# Patient Record
Sex: MALE | Race: BLACK OR AFRICAN AMERICAN | NOT HISPANIC OR LATINO | Employment: FULL TIME | URBAN - METROPOLITAN AREA
[De-identification: names, ages, dates, MRNs, and addresses within clinical notes are randomized per-mention and may not be internally consistent; named-entity substitution may affect disease eponyms.]

---
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
## 2019-02-02 ENCOUNTER — HOSPITAL ENCOUNTER (EMERGENCY)
Facility: HOSPITAL | Age: 19
Discharge: HOME | End: 2019-02-02
Attending: EMERGENCY MEDICINE

## 2019-02-02 VITALS
OXYGEN SATURATION: 98 % | BODY MASS INDEX: 17.61 KG/M2 | WEIGHT: 130 LBS | HEART RATE: 63 BPM | RESPIRATION RATE: 18 BRPM | DIASTOLIC BLOOD PRESSURE: 58 MMHG | HEIGHT: 72 IN | SYSTOLIC BLOOD PRESSURE: 104 MMHG | TEMPERATURE: 99 F

## 2019-02-02 DIAGNOSIS — T78.40XA ALLERGIC REACTION, INITIAL ENCOUNTER: Primary | ICD-10-CM

## 2019-02-02 PROCEDURE — 99283 EMERGENCY DEPT VISIT LOW MDM: CPT

## 2019-02-02 RX ORDER — PREDNISONE 50 MG/1
50 TABLET ORAL DAILY
Qty: 3 TABLET | Refills: 0 | Status: SHIPPED | OUTPATIENT
Start: 2019-02-02 | End: 2019-02-05

## 2019-02-03 NOTE — ED PROVIDER NOTES
"Encounter Date: 2019    SCRIBE #1 NOTE: I, Liam Blanco, am scribing for, and in the presence of,  Shiva Dominique MD. I have scribed the following portions of the note - Other sections scribed: HPI, ROS, PE.       History     Chief Complaint   Patient presents with    Allergic Reaction     pt has unknown allergen, has epi pen but was not with him, reports hives and feeling like his throat was closing, given 50mg Benadryl, 125mg Solumedrol, and Epi.      CC: Allergic Reaction    HPI: This 18 y.o male with medical hx of Hives, Laryngeal edema, Tympanoplasty presents to the ED via EMS accompanied by his parents for an evaluation of allergic reaction. Pt reports he began experiencing diffused rashes with pruritis and SOB at about 1900 this evening (2019). No abdominal pain, N/V/D, or SOB reported. He denies any recent changes to his diet, detergents, soaps, or shampoos. Pt's mother reports she had an Epipen available at the home but realized it had . She notes that pt was given Benadryl at home and was also given Epi, Solumedrol 125 mg, and Benadryl 50mg by EMS at around 2000 while en route to the ED with improvement of sx. She mentions that pt experienced similar sx a "couple" years ago, noting his lips had turned blue in color. She states he has not seen an allergist in a "while." She denies any family hx of allergies. Pt also denies smoking, alcoholic consumption, or drug usage.      The history is provided by the patient and a parent (mother). No  was used.     Review of patient's allergies indicates:  No Known Allergies  Past Medical History:   Diagnosis Date    Hives     large whelps several times in the past    Laryngeal edema allergic agent unknown    history of allergic reactions     Past Surgical History:   Procedure Laterality Date    TYMPANOPLASTY      right ear     Family History   Problem Relation Age of Onset    Stroke Maternal Grandfather     Hypertension " Maternal Grandfather     Hypertension Paternal Grandfather      Social History     Tobacco Use    Smoking status: Never Smoker    Smokeless tobacco: Never Used   Substance Use Topics    Alcohol use: No    Drug use: No     Review of Systems   Constitutional: Negative for chills and fever.   HENT: Negative for congestion, ear pain, rhinorrhea and sore throat.    Eyes: Negative for pain and visual disturbance.   Respiratory: Positive for shortness of breath. Negative for cough.    Cardiovascular: Negative for chest pain.   Gastrointestinal: Negative for abdominal pain, diarrhea, nausea and vomiting.   Genitourinary: Negative for dysuria.   Musculoskeletal: Negative for back pain and neck pain.   Skin: Positive for rash (diffused, with pruritis).   Neurological: Negative for headaches.   Psychiatric/Behavioral: Negative for confusion.   All other systems reviewed and are negative.      Physical Exam     Initial Vitals [02/02/19 2024]   BP Pulse Resp Temp SpO2   119/71 78 18 98.4 °F (36.9 °C) 98 %      MAP       --         Vitals:    02/02/19 2024 02/02/19 2146 02/02/19 2307 02/02/19 2323   BP: 119/71 (!) 119/58 (!) 115/57 (!) 104/58   BP Location: Right arm      Patient Position: Lying      Pulse: 78 68 63 63   Resp: 18 18  18   Temp: 98.4 °F (36.9 °C) 98.5 °F (36.9 °C)  98.5 °F (36.9 °C)   TempSrc: Oral Oral  Oral   SpO2: 98% 100% 98%    Weight: 59 kg (130 lb)      Height: 6' (1.829 m)          Physical Exam    Nursing note and vitals reviewed.  Constitutional: He appears well-developed and well-nourished. He is not diaphoretic. No distress.   HENT:   Head: Normocephalic and atraumatic.   Right Ear: External ear normal.   Left Ear: External ear normal.   Mouth/Throat: Oropharynx is clear and moist. No oropharyngeal exudate.   Eyes: Conjunctivae and EOM are normal. Pupils are equal, round, and reactive to light. No scleral icterus.   Neck: Normal range of motion. Neck supple. No JVD present.   Cardiovascular:  Normal rate, regular rhythm, normal heart sounds and intact distal pulses. Exam reveals no gallop and no friction rub.    No murmur heard.  Pulmonary/Chest: Breath sounds normal. No stridor. No respiratory distress. He has no wheezes. He has no rhonchi. He has no rales.   Abdominal: Soft. Bowel sounds are normal. He exhibits no distension. There is no tenderness. There is no rebound and no guarding.   Musculoskeletal: Normal range of motion. He exhibits no edema or tenderness.   Neurological: He is alert and oriented to person, place, and time. He has normal strength. No cranial nerve deficit.   Skin: Skin is warm and dry. Rash noted.   Few resolving, generalized erythematous areas that were previously hives.   Psychiatric: He has a normal mood and affect. His behavior is normal. Thought content normal.         ED Course   Procedures  Labs Reviewed - No data to display       Imaging Results    None          Medical Decision Making:   Initial Assessment:   18-year-old male with history of idiopathic allergic reactions presenting with urticaria, feeling of throat swelling, itching, feeling short of breath. On exam, patient well-appearing in no acute distress.  Somewhat drowsy 2nd to Benadryl administration.  Received epi, Solu-Medrol, Benadryl on route.  Hives resolving.  Patient no longer feels short of breath or throat closing.  Symptoms started improving approximately 2 hr ago.  Denies any new allergens or contacts.  Will monitor for total 4 hr post improvement.  Discussed strict return precautions as well as need to follow up with an allergist.  Patient given new prescription for EpiPen.  No active nausea, vomiting, tachypnea, oral pharyngeal swelling, mucosal swelling, and clear breath sounds. Okay for discharge home.            Scribe Attestation:   Scribe #1: I performed the above scribed service and the documentation accurately describes the services I performed. I attest to the accuracy of the  note.    Attending Attestation:           Physician Attestation for Scribe:  Physician Attestation Statement for Scribe #1: I, Shiva Dominique MD, reviewed documentation, as scribed by Liam Blanco in my presence, and it is both accurate and complete.                    Clinical Impression:   The encounter diagnosis was Allergic reaction, initial encounter.      Disposition:   Disposition: Discharged  Condition: Stable                        Shiva Dominique MD  02/07/19 0101

## 2019-11-04 ENCOUNTER — HOSPITAL ENCOUNTER (EMERGENCY)
Facility: HOSPITAL | Age: 19
Discharge: HOME | End: 2019-11-05
Attending: EMERGENCY MEDICINE
Payer: MEDICAID

## 2019-11-04 DIAGNOSIS — R07.9 CHEST PAIN: ICD-10-CM

## 2019-11-04 DIAGNOSIS — R07.89 CHEST WALL PAIN: Primary | ICD-10-CM

## 2019-11-04 PROCEDURE — 96374 THER/PROPH/DIAG INJ IV PUSH: CPT

## 2019-11-04 PROCEDURE — 99285 EMERGENCY DEPT VISIT HI MDM: CPT | Mod: 25

## 2019-11-04 PROCEDURE — 96375 TX/PRO/DX INJ NEW DRUG ADDON: CPT

## 2019-11-05 VITALS
TEMPERATURE: 99 F | RESPIRATION RATE: 18 BRPM | DIASTOLIC BLOOD PRESSURE: 55 MMHG | BODY MASS INDEX: 17.61 KG/M2 | OXYGEN SATURATION: 98 % | WEIGHT: 130 LBS | HEIGHT: 72 IN | SYSTOLIC BLOOD PRESSURE: 99 MMHG | HEART RATE: 66 BPM

## 2019-11-05 LAB
ALBUMIN SERPL BCP-MCNC: 4.2 G/DL (ref 3.5–5.2)
ALP SERPL-CCNC: 69 U/L (ref 55–135)
ALT SERPL W/O P-5'-P-CCNC: 8 U/L (ref 10–44)
ANION GAP SERPL CALC-SCNC: 7 MMOL/L (ref 8–16)
AST SERPL-CCNC: 16 U/L (ref 10–40)
BASOPHILS # BLD AUTO: 0.02 K/UL (ref 0–0.2)
BASOPHILS NFR BLD: 0.5 % (ref 0–1.9)
BILIRUB SERPL-MCNC: 0.7 MG/DL (ref 0.1–1)
BUN SERPL-MCNC: 6 MG/DL (ref 6–20)
CALCIUM SERPL-MCNC: 9.1 MG/DL (ref 8.7–10.5)
CHLORIDE SERPL-SCNC: 106 MMOL/L (ref 95–110)
CO2 SERPL-SCNC: 28 MMOL/L (ref 23–29)
CREAT SERPL-MCNC: 0.8 MG/DL (ref 0.5–1.4)
DIFFERENTIAL METHOD: ABNORMAL
EOSINOPHIL # BLD AUTO: 0.1 K/UL (ref 0–0.5)
EOSINOPHIL NFR BLD: 3.5 % (ref 0–8)
ERYTHROCYTE [DISTWIDTH] IN BLOOD BY AUTOMATED COUNT: 12.7 % (ref 11.5–14.5)
EST. GFR  (AFRICAN AMERICAN): >60 ML/MIN/1.73 M^2
EST. GFR  (NON AFRICAN AMERICAN): >60 ML/MIN/1.73 M^2
GLUCOSE SERPL-MCNC: 87 MG/DL (ref 70–110)
HCT VFR BLD AUTO: 39.1 % (ref 40–54)
HGB BLD-MCNC: 12.5 G/DL (ref 14–18)
IMM GRANULOCYTES # BLD AUTO: 0 K/UL (ref 0–0.04)
IMM GRANULOCYTES NFR BLD AUTO: 0 % (ref 0–0.5)
LYMPHOCYTES # BLD AUTO: 2 K/UL (ref 1–4.8)
LYMPHOCYTES NFR BLD: 54.9 % (ref 18–48)
MCH RBC QN AUTO: 28.9 PG (ref 27–31)
MCHC RBC AUTO-ENTMCNC: 32 G/DL (ref 32–36)
MCV RBC AUTO: 90 FL (ref 82–98)
MONOCYTES # BLD AUTO: 0.5 K/UL (ref 0.3–1)
MONOCYTES NFR BLD: 13.2 % (ref 4–15)
NEUTROPHILS # BLD AUTO: 1 K/UL (ref 1.8–7.7)
NEUTROPHILS NFR BLD: 27.9 % (ref 38–73)
NRBC BLD-RTO: 0 /100 WBC
PLATELET # BLD AUTO: 197 K/UL (ref 150–350)
PMV BLD AUTO: 9.4 FL (ref 9.2–12.9)
POTASSIUM SERPL-SCNC: 3.7 MMOL/L (ref 3.5–5.1)
PROT SERPL-MCNC: 6.6 G/DL (ref 6–8.4)
RBC # BLD AUTO: 4.33 M/UL (ref 4.6–6.2)
SODIUM SERPL-SCNC: 141 MMOL/L (ref 136–145)
TROPONIN I SERPL DL<=0.01 NG/ML-MCNC: <0.006 NG/ML (ref 0–0.03)
WBC # BLD AUTO: 3.7 K/UL (ref 3.9–12.7)

## 2019-11-05 PROCEDURE — 85025 COMPLETE CBC W/AUTO DIFF WBC: CPT

## 2019-11-05 PROCEDURE — 93005 ELECTROCARDIOGRAM TRACING: CPT

## 2019-11-05 PROCEDURE — 63600175 PHARM REV CODE 636 W HCPCS: Performed by: EMERGENCY MEDICINE

## 2019-11-05 PROCEDURE — 93010 EKG 12-LEAD: ICD-10-PCS | Mod: ,,, | Performed by: INTERNAL MEDICINE

## 2019-11-05 PROCEDURE — 84484 ASSAY OF TROPONIN QUANT: CPT

## 2019-11-05 PROCEDURE — 93010 ELECTROCARDIOGRAM REPORT: CPT | Mod: ,,, | Performed by: INTERNAL MEDICINE

## 2019-11-05 PROCEDURE — 80053 COMPREHEN METABOLIC PANEL: CPT

## 2019-11-05 RX ORDER — ONDANSETRON 2 MG/ML
4 INJECTION INTRAMUSCULAR; INTRAVENOUS
Status: COMPLETED | OUTPATIENT
Start: 2019-11-05 | End: 2019-11-05

## 2019-11-05 RX ORDER — KETOROLAC TROMETHAMINE 30 MG/ML
15 INJECTION, SOLUTION INTRAMUSCULAR; INTRAVENOUS
Status: COMPLETED | OUTPATIENT
Start: 2019-11-05 | End: 2019-11-05

## 2019-11-05 RX ORDER — NAPROXEN 375 MG/1
375 TABLET ORAL 2 TIMES DAILY PRN
Qty: 14 TABLET | Refills: 0 | Status: SHIPPED | OUTPATIENT
Start: 2019-11-05 | End: 2019-11-12

## 2019-11-05 RX ADMIN — KETOROLAC TROMETHAMINE 15 MG: 30 INJECTION, SOLUTION INTRAMUSCULAR; INTRAVENOUS at 12:11

## 2019-11-05 RX ADMIN — ONDANSETRON HYDROCHLORIDE 4 MG: 2 SOLUTION INTRAMUSCULAR; INTRAVENOUS at 12:11

## 2019-11-05 NOTE — ED PROVIDER NOTES
Encounter Date: 11/4/2019    SCRIBE #1 NOTE: I, Sarah Schaeffer, am scribing for, and in the presence of,  Priscila Barron MD. I have scribed the following portions of the note - Other sections scribed: HPI,ROS,PE.       History     Chief Complaint   Patient presents with    Chest Pain     Pt reports left intermittent chest wall pain x2 weeks. Reports pain is stabbing/squeezing. Denies trauma     CC: Chest pain     HPI:  This is a 19 y.o. male with no pertinent PMHx who presents to the Emergency Department with a cc of episodes of  intermittent anterior left chest wall pain lasting for several minutes that started about two weeks ago. Pt describes pain as a squeezing and pulling feeling and notes it lasted longer than usual tonight. Pt reports associated nausea. Pt denies vomiting, fever, shortness of breath, cough, or further associated symptoms. Denies taking any medication prior to arrival for his symptoms. No alleviating factors. No known drug allergies. Pt reports family history of cardiac problems on his paternal side but is unsure of what. Denies smoking. Denies lifting weights or straining himself.     The history is provided by the patient. No  was used.     Review of patient's allergies indicates:  No Known Allergies  Past Medical History:   Diagnosis Date    Hives     large whelps several times in the past    Laryngeal edema allergic agent unknown    history of allergic reactions     Past Surgical History:   Procedure Laterality Date    TYMPANOPLASTY      right ear     Family History   Problem Relation Age of Onset    Stroke Maternal Grandfather     Hypertension Maternal Grandfather     Hypertension Paternal Grandfather      Social History     Tobacco Use    Smoking status: Never Smoker    Smokeless tobacco: Never Used   Substance Use Topics    Alcohol use: No    Drug use: No     Review of Systems   Constitutional: Negative for fever.   HENT: Negative for congestion and sore  throat.    Eyes: Negative for visual disturbance.   Respiratory: Negative for cough and shortness of breath.    Cardiovascular: Positive for chest pain.   Gastrointestinal: Positive for nausea. Negative for abdominal pain and vomiting.   Genitourinary: Negative for dysuria.   Skin: Negative for rash.   Allergic/Immunologic: Negative for immunocompromised state.   Neurological: Negative for headaches.       Physical Exam     Initial Vitals [11/04/19 2355]   BP Pulse Resp Temp SpO2   (!) 140/86 73 18 97.5 °F (36.4 °C) 100 %      MAP       --         Physical Exam    Constitutional: He appears well-developed and well-nourished. He is not diaphoretic. No distress.   HENT:   Mouth/Throat: Oropharynx is clear and moist.   Eyes: Pupils are equal, round, and reactive to light.   Neck: Neck supple.   Cardiovascular: Normal rate and regular rhythm.   Pulses:       Radial pulses are 2+ on the right side, and 2+ on the left side.   Pulmonary/Chest: Breath sounds normal. No respiratory distress.   Anterior left chest wall pain that is reproduced with palpation across left pectoralis muscle   Abdominal: Soft. Bowel sounds are normal. There is no tenderness.   Musculoskeletal: He exhibits no edema.   Neurological: He is alert and oriented to person, place, and time.   Skin: Skin is warm and dry.   No overlying rash noted to the left chest.    Psychiatric: He has a normal mood and affect.         ED Course   Procedures  Labs Reviewed   CBC W/ AUTO DIFFERENTIAL - Abnormal; Notable for the following components:       Result Value    WBC 3.70 (*)     RBC 4.33 (*)     Hemoglobin 12.5 (*)     Hematocrit 39.1 (*)     Gran # (ANC) 1.0 (*)     Gran% 27.9 (*)     Lymph% 54.9 (*)     All other components within normal limits   COMPREHENSIVE METABOLIC PANEL - Abnormal; Notable for the following components:    ALT 8 (*)     Anion Gap 7 (*)     All other components within normal limits   TROPONIN I     EKG Readings: (Independently Interpreted)    Normal sinus rhythm, rate 69 beats per minute, no STEMI, no malignant dysrhythmia.  Normal [place] interval.  .       Imaging Results          X-Ray Chest PA And Lateral (Final result)  Result time 11/05/19 01:06:27    Final result by Zeina St MD (11/05/19 01:06:27)                 Impression:      No acute intrathoracic abnormality.      Electronically signed by: Zeina St  Date:    11/05/2019  Time:    01:06             Narrative:    EXAMINATION:  CHEST PA AND LATERAL    CLINICAL HISTORY:  Chest pain, unspecified    TECHNIQUE:  PA and lateral chest radiograph    COMPARISON:  12/23/2018    FINDINGS:  The cardiac silhouette is within normal limits.   There is no focal consolidation, pneumothorax, or pleural effusion.                                 Medical Decision Making:   Initial Assessment:   19-year-old male presents with left anterior chest pain ongoing for 2 weeks, intermittent, associated with nausea.  Possible family history of cardiac disease but unclear.  No smoking.  Exam notable for reproducible left anterior chest wall tenderness. I suspect musculoskeletal etiology, I do not suspect dissection, pneumonia, pneumothorax, acute MI.  I will get screening labs including a troponin, EKG, chest x-ray.  Will treat with Toradol and Zofran.  Clinical Tests:   Lab Tests: Ordered and Reviewed  Radiological Study: Ordered and Reviewed  Medical Tests: Ordered and Reviewed  ED Management:  On reassessment, patient is sleeping in bed with significant other.  No apparent distress. I reviewed with patient my suspicion that this is a musculoskeletal pain and recommend treatment with naproxen.  I have referred him to the [facility] to establish primary care and to recheck his symptoms.  Patient states he understands and agrees with plan.    Additional MDM:   Heart Score:    History:          Slightly suspicious.  ECG:             Normal  Age:               Less than 45 years  Risk factors:  1-2 risk factors  Troponin:       Less than or equal to normal limit  Final Score: 1             Scribe Attestation:   Scribe #1: I performed the above scribed service and the documentation accurately describes the services I performed. I attest to the accuracy of the note.               Clinical Impression:       ICD-10-CM ICD-9-CM   1. Chest wall pain R07.89 786.52   2. Chest pain R07.9 786.50            I, Priscila Barron MD, personally performed the services described in this documentation. All medical record entries made by the scribe were at my direction and in my presence. I have reviewed the chart and agree that the record reflects my personal performance and is accurate and complete.                   Priscila Barron MD  11/05/19 0112

## 2019-11-05 NOTE — ED TRIAGE NOTES
Pt here with c/o intermittent chest wall pain x 2 weeks. Pt reports feeling SOB with the pain. Pt denies any injuries, pain worse with movements.

## 2020-07-20 ENCOUNTER — HOSPITAL ENCOUNTER (EMERGENCY)
Facility: HOSPITAL | Age: 20
Discharge: HOME | End: 2020-07-20
Attending: EMERGENCY MEDICINE
Payer: MEDICAID

## 2020-07-20 VITALS
HEART RATE: 63 BPM | TEMPERATURE: 98 F | WEIGHT: 130 LBS | BODY MASS INDEX: 17.61 KG/M2 | DIASTOLIC BLOOD PRESSURE: 58 MMHG | RESPIRATION RATE: 18 BRPM | OXYGEN SATURATION: 100 % | SYSTOLIC BLOOD PRESSURE: 125 MMHG | HEIGHT: 72 IN

## 2020-07-20 DIAGNOSIS — T78.2XXA ANAPHYLAXIS, INITIAL ENCOUNTER: Primary | ICD-10-CM

## 2020-07-20 DIAGNOSIS — L50.9 URTICARIAL RASH: ICD-10-CM

## 2020-07-20 PROCEDURE — 25000003 PHARM REV CODE 250: Performed by: EMERGENCY MEDICINE

## 2020-07-20 PROCEDURE — 63600175 PHARM REV CODE 636 W HCPCS: Performed by: EMERGENCY MEDICINE

## 2020-07-20 PROCEDURE — 96372 THER/PROPH/DIAG INJ SC/IM: CPT

## 2020-07-20 PROCEDURE — 99284 EMERGENCY DEPT VISIT MOD MDM: CPT | Mod: 25

## 2020-07-20 RX ORDER — EPINEPHRINE 0.3 MG/.3ML
0.3 INJECTION SUBCUTANEOUS
Status: COMPLETED | OUTPATIENT
Start: 2020-07-20 | End: 2020-07-20

## 2020-07-20 RX ORDER — FAMOTIDINE 20 MG/1
20 TABLET, FILM COATED ORAL
Status: COMPLETED | OUTPATIENT
Start: 2020-07-20 | End: 2020-07-20

## 2020-07-20 RX ORDER — PREDNISONE 20 MG/1
40 TABLET ORAL
Status: COMPLETED | OUTPATIENT
Start: 2020-07-20 | End: 2020-07-20

## 2020-07-20 RX ORDER — PREDNISONE 20 MG/1
40 TABLET ORAL DAILY
Qty: 10 TABLET | Refills: 0 | Status: SHIPPED | OUTPATIENT
Start: 2020-07-20 | End: 2020-07-25

## 2020-07-20 RX ORDER — EPINEPHRINE 0.3 MG/.3ML
1 INJECTION SUBCUTANEOUS
Qty: 2 DEVICE | Refills: 1 | Status: SHIPPED | OUTPATIENT
Start: 2020-07-20 | End: 2021-07-20

## 2020-07-20 RX ORDER — DIPHENHYDRAMINE HCL 50 MG
50 CAPSULE ORAL
Status: COMPLETED | OUTPATIENT
Start: 2020-07-20 | End: 2020-07-20

## 2020-07-20 RX ADMIN — PREDNISONE 40 MG: 20 TABLET ORAL at 03:07

## 2020-07-20 RX ADMIN — FAMOTIDINE 20 MG: 20 TABLET ORAL at 03:07

## 2020-07-20 RX ADMIN — EPINEPHRINE 0.3 MG: 0.3 INJECTION INTRAMUSCULAR at 03:07

## 2020-07-20 RX ADMIN — DIPHENHYDRAMINE HYDROCHLORIDE 50 MG: 50 CAPSULE ORAL at 03:07

## 2020-07-20 NOTE — ED PROVIDER NOTES
Encounter Date: 7/20/2020       History     Chief Complaint   Patient presents with    Allergic Reaction     Reports tingling sensation of his lips and tongue. Pt reports hives/rash all over his body. Symptoms started around 0130. +itching and scratching.     Urticaria     Patient presents for evaluation of diffuse itching and rash.  Patient also complains of swelling around the eyes and upper lip.  Patient has history of anaphylaxis in the past.  He is uncertain when his trigger is.  Symptoms started while he was taking a shower tonight.  No new foods.  No new medications.  He has had a prescription for EpiPen in the past for angioedema of the throat.  He admits to mild hoarseness.  He denies difficulty swallowing.  He denies tongue swelling.  No wheezing or cough.  No weakness or dizziness.  Patient has never seen an allergist.        Review of patient's allergies indicates:  No Known Allergies  Past Medical History:   Diagnosis Date    Hives     large whelps several times in the past    Laryngeal edema allergic agent unknown    history of allergic reactions     Past Surgical History:   Procedure Laterality Date    TYMPANOPLASTY      right ear     Family History   Problem Relation Age of Onset    Stroke Maternal Grandfather     Hypertension Maternal Grandfather     Hypertension Paternal Grandfather      Social History     Tobacco Use    Smoking status: Never Smoker    Smokeless tobacco: Never Used   Substance Use Topics    Alcohol use: No    Drug use: No     Review of Systems   Constitutional: Negative for chills, diaphoresis and fever.   HENT: Positive for facial swelling and voice change. Negative for congestion, drooling, sore throat and trouble swallowing.    Eyes: Positive for redness. Negative for itching.   Respiratory: Negative for cough, chest tightness, shortness of breath, wheezing and stridor.    Cardiovascular: Negative for chest pain and palpitations.   Gastrointestinal: Negative for  abdominal pain, diarrhea, nausea and vomiting.        NO melena or rectal bleeding   Musculoskeletal: Negative for back pain.   Skin: Positive for rash.   Neurological: Negative for weakness, light-headedness and headaches.        No numbness   All other systems reviewed and are negative.      Physical Exam     Initial Vitals [07/20/20 0230]   BP Pulse Resp Temp SpO2   118/60 70 20 98 °F (36.7 °C) 95 %      MAP       --         Physical Exam    Nursing note and vitals reviewed.  Constitutional: He appears well-developed and well-nourished. He is not diaphoretic. No distress.   HENT:   Head: Normocephalic and atraumatic.   Right Ear: External ear normal.   Left Ear: External ear normal.   Nose: Nose normal.   Mild edema of uvula.  Normal tongue.  Mild hoarseness.  Mild swelling of the upper lip.  Normal lower lip.   Eyes: EOM are normal. Pupils are equal, round, and reactive to light. Right eye exhibits no discharge. Left eye exhibits no discharge. No scleral icterus.   Bilateral conjunctiva injection.  Mild periorbital edema.   Neck: Neck supple. No tracheal deviation present.   Cardiovascular: Normal rate, regular rhythm and normal heart sounds.   No murmur heard.  Pulmonary/Chest: Breath sounds normal. No stridor. No respiratory distress. He has no wheezes. He has no rhonchi. He has no rales.   Musculoskeletal: Normal range of motion. No edema.   Neurological: He is alert and oriented to person, place, and time.   Skin: Skin is warm and dry. Rash noted.   Diffuse urticarial rash.   Psychiatric: He has a normal mood and affect. His behavior is normal. Judgment and thought content normal.         ED Course   Procedures  Labs Reviewed - No data to display       Imaging Results    None          Medical Decision Making:   Initial Assessment:   Patient presents acute inflammatory reaction.  Unknown trigger.  Minimal airway symptoms.  Will give epinephrine IM.  Will give antihistamines corticosteroids.  Will observe and  reassess.  Differential Diagnosis:   Anaphylaxis, urticaria, angioedema  ED Management:  Patient has had at shift change to Dr. Daly to assess response to treatment and determine disposition. VICTORINA Beavers MD                                 Clinical Impression:       ICD-10-CM ICD-9-CM   1. Anaphylaxis, initial encounter  T78.2XXA 995.0   2. Urticarial rash  L50.9 708.9                                Gerhard Beavers MD  07/20/20 0309

## 2020-07-20 NOTE — ED TRIAGE NOTES
Pt arrived to ED with c/o Allergic Reaction (Reports tingling sensation of his lips and tongue. Pt reports hives/rash all over his body. Symptoms started around 0130. +itching and scratching.  Pt states no change in soap,detergents, or food

## 2020-12-07 ENCOUNTER — HOSPITAL ENCOUNTER (EMERGENCY)
Facility: HOSPITAL | Age: 20
Discharge: HOME | End: 2020-12-07
Attending: EMERGENCY MEDICINE
Payer: MEDICAID

## 2020-12-07 VITALS
BODY MASS INDEX: 18.28 KG/M2 | HEIGHT: 72 IN | OXYGEN SATURATION: 99 % | WEIGHT: 135 LBS | RESPIRATION RATE: 18 BRPM | DIASTOLIC BLOOD PRESSURE: 60 MMHG | TEMPERATURE: 99 F | SYSTOLIC BLOOD PRESSURE: 125 MMHG | HEART RATE: 72 BPM

## 2020-12-07 DIAGNOSIS — R11.0 NAUSEA: Primary | ICD-10-CM

## 2020-12-07 DIAGNOSIS — R19.7 DIARRHEA, UNSPECIFIED TYPE: ICD-10-CM

## 2020-12-07 DIAGNOSIS — R10.9 ABDOMINAL PAIN: ICD-10-CM

## 2020-12-07 LAB
ALBUMIN SERPL BCP-MCNC: 5 G/DL (ref 3.5–5.2)
ALP SERPL-CCNC: 68 U/L (ref 55–135)
ALT SERPL W/O P-5'-P-CCNC: 47 U/L (ref 10–44)
ANION GAP SERPL CALC-SCNC: 7 MMOL/L (ref 8–16)
AST SERPL-CCNC: 33 U/L (ref 10–40)
BACTERIA #/AREA URNS HPF: NORMAL /HPF
BASOPHILS # BLD AUTO: 0.03 K/UL (ref 0–0.2)
BASOPHILS NFR BLD: 0.8 % (ref 0–1.9)
BILIRUB SERPL-MCNC: 1.5 MG/DL (ref 0.1–1)
BILIRUB UR QL STRIP: NEGATIVE
BUN SERPL-MCNC: 6 MG/DL (ref 6–20)
CALCIUM SERPL-MCNC: 9.6 MG/DL (ref 8.7–10.5)
CHLORIDE SERPL-SCNC: 105 MMOL/L (ref 95–110)
CLARITY UR: ABNORMAL
CO2 SERPL-SCNC: 29 MMOL/L (ref 23–29)
COLOR UR: YELLOW
CREAT SERPL-MCNC: 1 MG/DL (ref 0.5–1.4)
DIFFERENTIAL METHOD: ABNORMAL
EOSINOPHIL # BLD AUTO: 0.1 K/UL (ref 0–0.5)
EOSINOPHIL NFR BLD: 2.7 % (ref 0–8)
ERYTHROCYTE [DISTWIDTH] IN BLOOD BY AUTOMATED COUNT: 13.2 % (ref 11.5–14.5)
EST. GFR  (AFRICAN AMERICAN): >60 ML/MIN/1.73 M^2
EST. GFR  (NON AFRICAN AMERICAN): >60 ML/MIN/1.73 M^2
GLUCOSE SERPL-MCNC: 101 MG/DL (ref 70–110)
GLUCOSE UR QL STRIP: NEGATIVE
HCT VFR BLD AUTO: 45.4 % (ref 40–54)
HGB BLD-MCNC: 14.7 G/DL (ref 14–18)
HGB UR QL STRIP: NEGATIVE
HYALINE CASTS #/AREA URNS LPF: 0 /LPF
IMM GRANULOCYTES # BLD AUTO: 0 K/UL (ref 0–0.04)
IMM GRANULOCYTES NFR BLD AUTO: 0 % (ref 0–0.5)
KETONES UR QL STRIP: NEGATIVE
LEUKOCYTE ESTERASE UR QL STRIP: NEGATIVE
LIPASE SERPL-CCNC: 10 U/L (ref 4–60)
LYMPHOCYTES # BLD AUTO: 1.8 K/UL (ref 1–4.8)
LYMPHOCYTES NFR BLD: 49.1 % (ref 18–48)
MCH RBC QN AUTO: 29.2 PG (ref 27–31)
MCHC RBC AUTO-ENTMCNC: 32.4 G/DL (ref 32–36)
MCV RBC AUTO: 90 FL (ref 82–98)
MICROSCOPIC COMMENT: NORMAL
MONOCYTES # BLD AUTO: 0.3 K/UL (ref 0.3–1)
MONOCYTES NFR BLD: 7.2 % (ref 4–15)
NEUTROPHILS # BLD AUTO: 1.5 K/UL (ref 1.8–7.7)
NEUTROPHILS NFR BLD: 40.2 % (ref 38–73)
NITRITE UR QL STRIP: NEGATIVE
NRBC BLD-RTO: 0 /100 WBC
PH UR STRIP: >8 [PH] (ref 5–8)
PLATELET # BLD AUTO: 207 K/UL (ref 150–350)
PMV BLD AUTO: 9.9 FL (ref 9.2–12.9)
POTASSIUM SERPL-SCNC: 4.1 MMOL/L (ref 3.5–5.1)
PROT SERPL-MCNC: 7.6 G/DL (ref 6–8.4)
PROT UR QL STRIP: ABNORMAL
RBC # BLD AUTO: 5.04 M/UL (ref 4.6–6.2)
RBC #/AREA URNS HPF: 0 /HPF (ref 0–4)
SODIUM SERPL-SCNC: 141 MMOL/L (ref 136–145)
SP GR UR STRIP: 1.02 (ref 1–1.03)
URN SPEC COLLECT METH UR: ABNORMAL
UROBILINOGEN UR STRIP-ACNC: NEGATIVE EU/DL
WBC # BLD AUTO: 3.73 K/UL (ref 3.9–12.7)
WBC #/AREA URNS HPF: 2 /HPF (ref 0–5)

## 2020-12-07 PROCEDURE — 96375 TX/PRO/DX INJ NEW DRUG ADDON: CPT

## 2020-12-07 PROCEDURE — 99285 EMERGENCY DEPT VISIT HI MDM: CPT | Mod: 25

## 2020-12-07 PROCEDURE — 81000 URINALYSIS NONAUTO W/SCOPE: CPT

## 2020-12-07 PROCEDURE — 85025 COMPLETE CBC W/AUTO DIFF WBC: CPT

## 2020-12-07 PROCEDURE — 96361 HYDRATE IV INFUSION ADD-ON: CPT

## 2020-12-07 PROCEDURE — 63600175 PHARM REV CODE 636 W HCPCS: Performed by: PHYSICIAN ASSISTANT

## 2020-12-07 PROCEDURE — 96374 THER/PROPH/DIAG INJ IV PUSH: CPT

## 2020-12-07 PROCEDURE — 83690 ASSAY OF LIPASE: CPT

## 2020-12-07 PROCEDURE — 80053 COMPREHEN METABOLIC PANEL: CPT

## 2020-12-07 PROCEDURE — 25500020 PHARM REV CODE 255: Performed by: PHYSICIAN ASSISTANT

## 2020-12-07 PROCEDURE — 25000003 PHARM REV CODE 250: Performed by: PHYSICIAN ASSISTANT

## 2020-12-07 RX ORDER — FAMOTIDINE 20 MG/1
20 TABLET, FILM COATED ORAL 2 TIMES DAILY
Qty: 20 TABLET | Refills: 0 | OUTPATIENT
Start: 2020-12-07 | End: 2023-06-07

## 2020-12-07 RX ORDER — IBUPROFEN 600 MG/1
600 TABLET ORAL EVERY 6 HOURS PRN
Qty: 20 TABLET | Refills: 0 | Status: SHIPPED | OUTPATIENT
Start: 2020-12-07 | End: 2023-03-21 | Stop reason: SDUPTHER

## 2020-12-07 RX ORDER — ONDANSETRON 2 MG/ML
4 INJECTION INTRAMUSCULAR; INTRAVENOUS
Status: COMPLETED | OUTPATIENT
Start: 2020-12-07 | End: 2020-12-07

## 2020-12-07 RX ORDER — KETOROLAC TROMETHAMINE 30 MG/ML
15 INJECTION, SOLUTION INTRAMUSCULAR; INTRAVENOUS
Status: COMPLETED | OUTPATIENT
Start: 2020-12-07 | End: 2020-12-07

## 2020-12-07 RX ORDER — ONDANSETRON 4 MG/1
4 TABLET, ORALLY DISINTEGRATING ORAL EVERY 6 HOURS PRN
Qty: 20 TABLET | Refills: 0 | OUTPATIENT
Start: 2020-12-07 | End: 2023-06-07

## 2020-12-07 RX ADMIN — SODIUM CHLORIDE 1000 ML: 0.9 INJECTION, SOLUTION INTRAVENOUS at 03:12

## 2020-12-07 RX ADMIN — KETOROLAC TROMETHAMINE 15 MG: 30 INJECTION, SOLUTION INTRAMUSCULAR at 03:12

## 2020-12-07 RX ADMIN — ONDANSETRON 4 MG: 2 INJECTION INTRAMUSCULAR; INTRAVENOUS at 03:12

## 2020-12-07 RX ADMIN — IOHEXOL 75 ML: 350 INJECTION, SOLUTION INTRAVENOUS at 04:12

## 2020-12-07 NOTE — ED TRIAGE NOTES
Pt. Reports he has been having lower abd pain for the past 2-3 weeks. Pt. Reports his pain is constant. Pt. Reports he has constipation and diarrhea within the last 24 hours.

## 2020-12-07 NOTE — ED PROVIDER NOTES
Encounter Date: 12/7/2020       History     Chief Complaint   Patient presents with    Abdominal Pain     lower abd pain started x 2 weeks     Chief complaint:  Abdominal pain     HPI:    20-year-old male with no pertinent past medical history presenting for evaluation 2 week history of lower abdominal pain that became constant and severe within the past 2 days.  Pain 8/10 worse with movement. Has had associated diarrhea intermittently 2 episodes per day. Denies melena hematochezia. Associated nausea with no vomiting. He states he is feeling bloated and gassy. Endorses lightheadedness due to decreased p.o. intake.  He denies any history of similar episodes, fever, chills, URI symptoms, dysuria, hematuria, urinary urgency or frequency, penile discharge, pain, swelling, testicular pain or swelling.  No history of abdominal surgeries.  He is passing flatus        Review of patient's allergies indicates:  No Known Allergies  Past Medical History:   Diagnosis Date    Hives     large whelps several times in the past    Laryngeal edema allergic agent unknown    history of allergic reactions     Past Surgical History:   Procedure Laterality Date    TYMPANOPLASTY      right ear     Family History   Problem Relation Age of Onset    Stroke Maternal Grandfather     Hypertension Maternal Grandfather     Hypertension Paternal Grandfather      Social History     Tobacco Use    Smoking status: Never Smoker    Smokeless tobacco: Never Used   Substance Use Topics    Alcohol use: No    Drug use: No     Review of Systems   Constitutional: Negative for chills and fever.   HENT: Negative for congestion, ear pain, rhinorrhea and sore throat.    Eyes: Negative for redness.   Respiratory: Negative for shortness of breath.    Cardiovascular: Negative for chest pain.   Gastrointestinal: Positive for abdominal pain, diarrhea and nausea. Negative for blood in stool, constipation and vomiting.   Genitourinary: Negative for discharge,  dysuria, frequency, hematuria, penile pain, penile swelling, scrotal swelling, testicular pain and urgency.   Musculoskeletal: Negative for back pain, myalgias and neck pain.   Skin: Negative for rash.   Neurological: Positive for light-headedness. Negative for dizziness, speech difficulty and weakness.   Hematological: Does not bruise/bleed easily.   Psychiatric/Behavioral: Negative for confusion.       Physical Exam     Initial Vitals [12/07/20 1435]   BP Pulse Resp Temp SpO2   125/60 72 18 98.5 °F (36.9 °C) 99 %      MAP       --         Physical Exam    Nursing note and vitals reviewed.  Constitutional: He appears well-developed and well-nourished. No distress.   HENT:   Head: Normocephalic.   Right Ear: Hearing, tympanic membrane, external ear and ear canal normal.   Left Ear: Hearing, tympanic membrane, external ear and ear canal normal.   Nose: Nose normal.   Mouth/Throat: Oropharynx is clear and moist. No oropharyngeal exudate, posterior oropharyngeal edema or posterior oropharyngeal erythema.   Eyes: Conjunctivae are normal.   Neck: Neck supple.   Pulmonary/Chest: No respiratory distress.   Abdominal: Soft. Bowel sounds are normal. He exhibits no distension. There is abdominal tenderness in the right lower quadrant, epigastric area, suprapubic area and left lower quadrant. There is no rigidity, no rebound, no guarding, no CVA tenderness, no tenderness at McBurney's point and negative Marshall's sign.   Lymphadenopathy:     He has no cervical adenopathy.   Neurological: He is alert.   Skin: Skin is warm and dry. No rash noted.   Psychiatric: He has a normal mood and affect.         ED Course   Procedures  Labs Reviewed   CBC W/ AUTO DIFFERENTIAL - Abnormal; Notable for the following components:       Result Value    WBC 3.73 (*)     Gran # (ANC) 1.5 (*)     Lymph % 49.1 (*)     All other components within normal limits   COMPREHENSIVE METABOLIC PANEL - Abnormal; Notable for the following components:    Total  Bilirubin 1.5 (*)     ALT 47 (*)     Anion Gap 7 (*)     All other components within normal limits   URINALYSIS, REFLEX TO URINE CULTURE - Abnormal; Notable for the following components:    Appearance, UA Cloudy (*)     pH, UA >8.0 (*)     Protein, UA 1+ (*)     All other components within normal limits    Narrative:     Specimen Source->Urine   LIPASE   URINALYSIS MICROSCOPIC    Narrative:     Specimen Source->Urine          Imaging Results          CT Abdomen Pelvis With Contrast (Final result)  Result time 12/07/20 17:33:43    Final result by Erick Cedeno MD (12/07/20 17:33:43)                 Impression:      No acute intra-abdominal abnormalities identified.  Appendix not clearly visualized, however no CT evidence of appendicitis seen, as clinically questioned.      Electronically signed by: Erick Cedeno MD  Date:    12/07/2020  Time:    17:33             Narrative:    EXAMINATION:  CT ABDOMEN PELVIS WITH CONTRAST    CLINICAL HISTORY:  RLQ abdominal pain, appendicitis suspected (Age => 13y);    TECHNIQUE:  Low dose axial images, sagittal and coronal reformations were obtained from the lung bases to the pubic symphysis following the IV administration of 75 mL of Omnipaque 350 .  Oral contrast was not given.    COMPARISON:  None.    FINDINGS:  The visualized portion of the heart is unremarkable.  The lung bases are clear.    No significant hepatic abnormalities are identified.  There is no intra-or extrahepatic biliary ductal dilatation.  The gallbladder is unremarkable.  The stomach, pancreas, spleen, and adrenal glands are unremarkable.    Kidneys enhance normally with no evidence of hydronephrosis.  Ureters are difficult to track.  Urinary bladder is nondistended.  Prostate is unremarkable.    Appendix is not clearly visualized along its entire length, however no abnormalities or inflammatory changes are seen in the region.  The visualized loops of small and large bowel show no evidence of obstruction  or inflammation.  No free air or free fluid.    Aorta tapers normally.    No acute osseous abnormality identified. Subcutaneous soft tissue show no significant abnormalities.                               X-Ray Abdomen Flat And Erect (Final result)  Result time 12/07/20 15:54:29    Final result by Lorraine Bustamante MD (12/07/20 15:54:29)                 Impression:      As above.      Electronically signed by: Lorraine Bustamante MD  Date:    12/07/2020  Time:    15:54             Narrative:    EXAMINATION:  XR ABDOMEN FLAT AND ERECT    CLINICAL HISTORY:  Unspecified abdominal pain    TECHNIQUE:  Flat and erect AP views of the abdomen were performed.    COMPARISON:  None    FINDINGS:  The intestinal gas pattern is normal with no small bowel air-fluid levels or free air on the upright image.  Hepatic shadow is prominent.  Minimal lumbar dextroscoliosis may be positional.  Otherwise, visualized osseous structures are intact.  No abnormal calcifications are seen.                                 Medical Decision Making:   Initial Assessment:   20-year-old male with no pertinent past medical history  presenting for 2 week history of intermittent lower abdominal pain became constant severe within the past 2 days.  Associated nausea and diarrhea and lightheadedness  Differential Diagnosis:   UTI gastroenteritis, acute abdomen, appendicitis, diverticulitis, constipation, bowel obstruction, electrolyte abnormality , CLAUDETTE, dehydration among others.   ED Management:  Patient is afebrile, nontoxic appearing in no distress.  Exam above with mild generalized abdominal tenderness worst in lower abdomen.  No peritoneal signs.  Patient given IV fluids, IV Toradol and IV Zofran in the ED.    \1615  Still having 4/10 pain. On re-evaluation only suprapubic and RLQ tenderness with no peritoneal signs.     X-ray abdomen with no evidence of bowel obstruction or significant constipation. Hepatic shadow is prominent. TBili 1.5, ALT 47.     CT  abdomen pelvis ordered to r/o appendicitis, acute abdomen.    Discussed pt with Demetris Lara PA-C at shift change. He will follow up final results and final disposition.   SIA Back PA-C 12/7/2020 1623                     ED Course as of Dec 07 1808   Mon Dec 07, 2020   1807 On re-evaluation, patient states majority of abdominal pain has resolved, nausea has resolved.  There is very mild tenderness to the suprapubic right lower quadrant.  No peritoneal signs or rebound tenderness.  No evidence of appendicitis on CT.  No leukocytosis.  No complaints of fever.  Low suspicion for emergent process, low suspicion for surgical process.  Advised patient and mom to continue supportive care, follow-up with primary care provider as planned, return to this ED if any persistence or worsening of symptoms, if he begins with fever, if any black or bloody stools, if any other problems occur.    [SM]      ED Course User Index  [SM] Demetris Lara PA-C            Clinical Impression:       ICD-10-CM ICD-9-CM   1. Nausea  R11.0 787.02   2. Abdominal pain  R10.9 789.00   3. Diarrhea, unspecified type  R19.7 787.91                          ED Disposition Condition    Discharge Stable        ED Prescriptions     Medication Sig Dispense Start Date End Date Auth. Provider    ibuprofen (ADVIL,MOTRIN) 600 MG tablet Take 1 tablet (600 mg total) by mouth every 6 (six) hours as needed for Pain. 20 tablet 12/7/2020  Demetris Lara PA-C    ondansetron (ZOFRAN-ODT) 4 MG TbDL Take 1 tablet (4 mg total) by mouth every 6 (six) hours as needed (Nausea). 20 tablet 12/7/2020  Demetris Lara PA-C    famotidine (PEPCID) 20 MG tablet Take 1 tablet (20 mg total) by mouth 2 (two) times daily. for 10 days 20 tablet 12/7/2020 12/17/2020 Demetris Lara PA-C        Follow-up Information     Follow up With Specialties Details Why Contact Info    Primary care provider  Go to  For reevaluation as planned                                         Demetris Lara PA-C  12/07/20 1801

## 2020-12-08 NOTE — DISCHARGE INSTRUCTIONS
Continue with ibuprofen as needed for pain.  Zofran as needed for nausea.  Drink lots of fluids, stay well hydrated.    Please follow-up with primary care provider as planned.  Return to this ED if worsening pain, if uncontrolled vomiting, if you begin with fever, if you begin with black or bloody stools, if any other problems occur.

## 2021-05-28 PROCEDURE — 99284 EMERGENCY DEPT VISIT MOD MDM: CPT | Mod: 25

## 2021-05-28 PROCEDURE — 12011 RPR F/E/E/N/L/M 2.5 CM/<: CPT

## 2021-05-28 RX ORDER — LIDOCAINE HYDROCHLORIDE 10 MG/ML
10 INJECTION INFILTRATION; PERINEURAL
Status: COMPLETED | OUTPATIENT
Start: 2021-05-28 | End: 2021-05-29

## 2021-05-29 ENCOUNTER — HOSPITAL ENCOUNTER (EMERGENCY)
Facility: HOSPITAL | Age: 21
Discharge: HOME | End: 2021-05-29
Attending: EMERGENCY MEDICINE
Payer: MEDICAID

## 2021-05-29 VITALS
TEMPERATURE: 99 F | HEART RATE: 62 BPM | HEIGHT: 72 IN | WEIGHT: 130 LBS | DIASTOLIC BLOOD PRESSURE: 67 MMHG | RESPIRATION RATE: 20 BRPM | SYSTOLIC BLOOD PRESSURE: 128 MMHG | OXYGEN SATURATION: 100 % | BODY MASS INDEX: 17.61 KG/M2

## 2021-05-29 DIAGNOSIS — S01.112A EYEBROW LACERATION, LEFT, INITIAL ENCOUNTER: Primary | ICD-10-CM

## 2021-05-29 PROCEDURE — 90715 TDAP VACCINE 7 YRS/> IM: CPT | Performed by: PHYSICIAN ASSISTANT

## 2021-05-29 PROCEDURE — 90471 IMMUNIZATION ADMIN: CPT | Performed by: PHYSICIAN ASSISTANT

## 2021-05-29 PROCEDURE — 63600175 PHARM REV CODE 636 W HCPCS: Performed by: PHYSICIAN ASSISTANT

## 2021-05-29 PROCEDURE — 25000003 PHARM REV CODE 250: Performed by: PHYSICIAN ASSISTANT

## 2021-05-29 PROCEDURE — 12011 RPR F/E/E/N/L/M 2.5 CM/<: CPT

## 2021-05-29 RX ADMIN — BACITRACIN, NEOMYCIN, POLYMYXIN B 1 EACH: 400; 3.5; 5 OINTMENT TOPICAL at 12:05

## 2021-05-29 RX ADMIN — LIDOCAINE HYDROCHLORIDE 10 ML: 10 INJECTION, SOLUTION INFILTRATION; PERINEURAL at 12:05

## 2021-05-29 RX ADMIN — CLOSTRIDIUM TETANI TOXOID ANTIGEN (FORMALDEHYDE INACTIVATED), CORYNEBACTERIUM DIPHTHERIAE TOXOID ANTIGEN (FORMALDEHYDE INACTIVATED), BORDETELLA PERTUSSIS TOXOID ANTIGEN (GLUTARALDEHYDE INACTIVATED), BORDETELLA PERTUSSIS FILAMENTOUS HEMAGGLUTININ ANTIGEN (FORMALDEHYDE INACTIVATED), BORDETELLA PERTUSSIS PERTACTIN ANTIGEN, AND BORDETELLA PERTUSSIS FIMBRIAE 2/3 ANTIGEN 0.5 ML: 5; 2; 2.5; 5; 3; 5 INJECTION, SUSPENSION INTRAMUSCULAR at 01:05

## 2021-10-25 NOTE — ED TRIAGE NOTES
Multiple episodes of vomiting this morning. Denies other symptoms. No fever. pt complains of body aches, chills, lower back pain and productive ( yellow) cough), fever, sore throat  x 2 days. Pt reports reports taking allergy medication.  Denies N/V/D back pain 10/10, neck and head pain 7/10

## 2022-12-16 ENCOUNTER — OCCUPATIONAL HEALTH (OUTPATIENT)
Dept: URGENT CARE | Facility: CLINIC | Age: 22
End: 2022-12-16

## 2022-12-16 DIAGNOSIS — Z02.89 ENCOUNTER FOR EXAMINATION REQUIRED BY DEPARTMENT OF TRANSPORTATION (DOT): Primary | ICD-10-CM

## 2022-12-16 PROCEDURE — 80305 OOH COLLECTION ONLY DRUG SCREEN: ICD-10-PCS | Mod: S$GLB,,, | Performed by: PHYSICIAN ASSISTANT

## 2022-12-16 PROCEDURE — 99499 PHYSICAL, RECERT DOT/CDL: ICD-10-PCS | Mod: S$GLB,,, | Performed by: PHYSICIAN ASSISTANT

## 2022-12-16 PROCEDURE — 80305 DRUG TEST PRSMV DIR OPT OBS: CPT | Mod: S$GLB,,, | Performed by: PHYSICIAN ASSISTANT

## 2022-12-16 PROCEDURE — 99499 UNLISTED E&M SERVICE: CPT | Mod: S$GLB,,, | Performed by: PHYSICIAN ASSISTANT

## 2023-03-06 ENCOUNTER — OFFICE VISIT (OUTPATIENT)
Dept: URGENT CARE | Facility: CLINIC | Age: 23
End: 2023-03-06
Payer: OTHER MISCELLANEOUS

## 2023-03-06 VITALS
BODY MASS INDEX: 18.9 KG/M2 | TEMPERATURE: 98 F | WEIGHT: 135 LBS | HEART RATE: 71 BPM | DIASTOLIC BLOOD PRESSURE: 71 MMHG | OXYGEN SATURATION: 98 % | SYSTOLIC BLOOD PRESSURE: 122 MMHG | HEIGHT: 71 IN

## 2023-03-06 DIAGNOSIS — M25.532 LEFT WRIST PAIN: ICD-10-CM

## 2023-03-06 DIAGNOSIS — Z02.6 ENCOUNTER RELATED TO WORKER'S COMPENSATION CLAIM: Primary | ICD-10-CM

## 2023-03-06 DIAGNOSIS — S66.912A STRAIN OF LEFT WRIST, INITIAL ENCOUNTER: ICD-10-CM

## 2023-03-06 PROCEDURE — 73110 X-RAY EXAM OF WRIST: CPT | Mod: LT,S$GLB,, | Performed by: RADIOLOGY

## 2023-03-06 PROCEDURE — 73110 XR WRIST COMPLETE 3 VIEWS LEFT: ICD-10-PCS | Mod: LT,S$GLB,, | Performed by: RADIOLOGY

## 2023-03-06 PROCEDURE — 99204 PR OFFICE/OUTPT VISIT, NEW, LEVL IV, 45-59 MIN: ICD-10-PCS | Mod: S$GLB,,, | Performed by: PHYSICIAN ASSISTANT

## 2023-03-06 PROCEDURE — 99204 OFFICE O/P NEW MOD 45 MIN: CPT | Mod: S$GLB,,, | Performed by: PHYSICIAN ASSISTANT

## 2023-03-06 RX ORDER — NAPROXEN 500 MG/1
500 TABLET ORAL 2 TIMES DAILY
Qty: 20 TABLET | Refills: 0 | Status: SHIPPED | OUTPATIENT
Start: 2023-03-06 | End: 2023-03-16

## 2023-03-06 NOTE — LETTER
Urgent Care - [address] [address]  Phone: [phone]  Fax: [phone]  [facility]: [phone]    Pt Name: Shade Neal Jr.  Injury Date: 03/02/2023   Employee ID: [id number] Date of First Treatment: 03/06/2023   Company: [employer]      Appointment Time: 11:50 AM Arrived: 12:01PM   Provider: Melba Mccormick PA-C Time Out:2:12PM     Office Treatment:   1. Encounter related to worker's compensation claim    2. Left wrist pain    3. Strain of left wrist, initial encounter      Medications Ordered This Encounter   Medications    naproxen (NAPROSYN) 500 MG tablet      Patient Instructions: Use splint as directed, Apply ice 24-48 hours then apply heat/warm soaks    Restrictions: No lifting/pushing/pulling more than 10 lbs, Limited use of left hand and arm, No driving company vehicles     Return Appointment: 3/9/2023 at 11:45AM    KW

## 2023-03-06 NOTE — PROGRESS NOTES
Subjective:       Patient ID: Shade Neal Jr. is a 22 y.o. male.    Chief Complaint: Wrist Injury (LEFT WRIST)    Mr. Neal presents for evaluation of left wrist injury, DOI 3/2/23.  He is a  for [employer].  He reports he slipped on a wet floor and grabbed a counter above him to stop from falling to the floor.  He immediately had severe pain in the left wrist.  He did not fall to the floor.  He has tingling in all of his fingertips.  He has pain in his wrist with all ROM and with extension of his fingers.  He tried an ACE wrap and ice with some relief.    See MA Note below.    Patient's place of employment - [employer]  Patient's job title -   Date of injury - 03/02/2023  Body part injured including left or right - LEFT WRIST  Injury Mechanism - SLIPPED ON THE FLOOR   What they were doing when they got hurt - WALKING  What they did immediately after - HAD A ICE PACK  Pain scale right now - 10    Wrist Injury   The incident occurred 3 to 5 days ago. The incident occurred at work. The injury mechanism was a fall. The pain is present in the left wrist and left hand. The quality of the pain is described as stabbing, shooting and aching. The pain radiates to the left hand. The pain is at a severity of 10/10. The pain is severe. The pain has been Constant since the incident. Associated symptoms include numbness and tingling. The symptoms are aggravated by movement. He has tried ice for the symptoms. The treatment provided moderate relief.     Constitution: Negative.   HENT: Negative.     Neck: neck negative.   Cardiovascular: Negative.    Gastrointestinal: Negative.    Musculoskeletal:  Positive for pain, joint pain and joint swelling.   Neurological:  Positive for numbness and tingling.      Objective:      Physical Exam  Vitals and nursing note reviewed.   Constitutional:       Appearance: Normal appearance.   HENT:      Head: Normocephalic and atraumatic.      Right Ear: Tympanic  membrane, ear canal and external ear normal. There is no impacted cerumen.      Left Ear: Tympanic membrane, ear canal and external ear normal. There is no impacted cerumen.      Nose: Nose normal. No congestion or rhinorrhea.      Mouth/Throat:      Mouth: Mucous membranes are moist.      Pharynx: No oropharyngeal exudate or posterior oropharyngeal erythema.   Eyes:      Extraocular Movements: Extraocular movements intact.      Conjunctiva/sclera: Conjunctivae normal.      Pupils: Pupils are equal, round, and reactive to light.   Cardiovascular:      Rate and Rhythm: Normal rate and regular rhythm.      Pulses: Normal pulses.      Heart sounds: Normal heart sounds.   Pulmonary:      Effort: Pulmonary effort is normal. No respiratory distress.      Breath sounds: Normal breath sounds. No stridor. No wheezing, rhonchi or rales.   Chest:      Chest wall: No tenderness.   Abdominal:      General: Bowel sounds are normal.      Palpations: Abdomen is soft.   Musculoskeletal:      Left wrist: Tenderness present. No swelling, deformity, snuff box tenderness or crepitus. Decreased range of motion.      Comments: TTP across volar wrist, worse at distal ulna.    No edema or ecchymosis.   Limited flexion & extension due to pain.  Able to radial & ulnar deviate.    Finger to thumb opposition WNL.    Patient has pain in wrist with extension of fingers.    NVI.   Skin:     General: Skin is warm.      Capillary Refill: Capillary refill takes less than 2 seconds.   Neurological:      General: No focal deficit present.      Mental Status: He is alert and oriented to person, place, and time. Mental status is at baseline.   Psychiatric:         Mood and Affect: Mood normal.         Behavior: Behavior normal.         Thought Content: Thought content normal.         Judgment: Judgment normal.         XR L wrist - No fracture or dislocation.  No bone destruction identified.    Assessment:       1. Encounter related to worker's  compensation claim    2. Left wrist pain    3. Strain of left wrist, initial encounter          Plan:       Wrist brace to be worn at all times except icing wrist & showering.  Naproxen BID PRN.  Limitations placed for work.  Follow up in 3 days.    Medications Ordered This Encounter   Medications    naproxen (NAPROSYN) 500 MG tablet     Sig: Take 1 tablet (500 mg total) by mouth 2 (two) times daily. for 10 days     Dispense:  20 tablet     Refill:  0     Patient Instructions: Use splint as directed, Apply ice 24-48 hours then apply heat/warm soaks   Restrictions: No lifting/pushing/pulling more than 10 lbs, Limited use of left hand and arm, No driving company vehicles  Follow up in about 3 days (around 3/9/2023).

## 2023-03-09 ENCOUNTER — OFFICE VISIT (OUTPATIENT)
Dept: URGENT CARE | Facility: CLINIC | Age: 23
End: 2023-03-09
Payer: OTHER MISCELLANEOUS

## 2023-03-09 VITALS
TEMPERATURE: 98 F | HEART RATE: 64 BPM | DIASTOLIC BLOOD PRESSURE: 67 MMHG | OXYGEN SATURATION: 98 % | SYSTOLIC BLOOD PRESSURE: 101 MMHG

## 2023-03-09 DIAGNOSIS — R20.0 NUMBNESS AND TINGLING OF LEFT THUMB: ICD-10-CM

## 2023-03-09 DIAGNOSIS — S66.912D STRAIN OF LEFT WRIST, SUBSEQUENT ENCOUNTER: Primary | ICD-10-CM

## 2023-03-09 DIAGNOSIS — Z02.6 ENCOUNTER RELATED TO WORKER'S COMPENSATION CLAIM: ICD-10-CM

## 2023-03-09 DIAGNOSIS — R20.2 NUMBNESS AND TINGLING OF LEFT THUMB: ICD-10-CM

## 2023-03-09 PROCEDURE — 99213 PR OFFICE/OUTPT VISIT, EST, LEVL III, 20-29 MIN: ICD-10-PCS | Mod: S$GLB,,, | Performed by: PHYSICIAN ASSISTANT

## 2023-03-09 PROCEDURE — 99213 OFFICE O/P EST LOW 20 MIN: CPT | Mod: S$GLB,,, | Performed by: PHYSICIAN ASSISTANT

## 2023-03-09 RX ORDER — ACETAMINOPHEN 500 MG
1000 TABLET ORAL EVERY 6 HOURS PRN
Qty: 60 TABLET | Refills: 0 | Status: SHIPPED | OUTPATIENT
Start: 2023-03-09 | End: 2023-03-21 | Stop reason: SDUPTHER

## 2023-03-09 RX ORDER — METHYLPREDNISOLONE 4 MG/1
TABLET ORAL
Qty: 1 EACH | Refills: 0 | Status: SHIPPED | OUTPATIENT
Start: 2023-03-09 | End: 2023-04-04 | Stop reason: ALTCHOICE

## 2023-03-09 NOTE — PROGRESS NOTES
Subjective:       Patient ID: Shade Neal Jr. is a 22 y.o. male.    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  He reports no significant improvement since last office visit.  He complains of tingling in the left thumb and index and middle fingers.  He states the thumb is the worst.  He denies previous left wrist injury.  Says he has been wearing the splint and taking naproxen twice daily without relief. MEB    Wrist Pain   Associated symptoms include a limited range of motion and numbness.     Constitution: Positive for activity change.   Musculoskeletal:  Positive for pain, trauma, joint pain and abnormal ROM of joint.   Neurological:  Positive for numbness and tingling.      Objective:      Physical Exam  Vitals and nursing note reviewed.   Constitutional:       General: He is not in acute distress.     Appearance: He is well-developed. He is not diaphoretic.   HENT:      Head: Normocephalic and atraumatic.      Right Ear: Hearing and external ear normal.      Left Ear: Hearing and external ear normal.      Nose: Nose normal. No nasal deformity.   Eyes:      General: Lids are normal. No scleral icterus.     Conjunctiva/sclera: Conjunctivae normal.   Neck:      Trachea: Trachea normal.   Cardiovascular:      Pulses: Normal pulses.   Pulmonary:      Effort: Pulmonary effort is normal. No respiratory distress.      Breath sounds: No stridor.   Musculoskeletal:      Left wrist: Tenderness present. No swelling or deformity. Decreased range of motion. Normal pulse.      Left hand: Tenderness present. No swelling or deformity. Normal range of  motion. Normal capillary refill.      Cervical back: Normal range of motion.      Comments: Patient has tender about the dorsal and volar aspects of the left wrist.  His fingers and thumb are also tender to palpation.  There is no E/E/D.  Range of motion of the wrist and fingers is limited.  Neurovascularly intact.   Skin:     General: Skin is warm and dry.      Capillary Refill: Capillary refill takes less than 2 seconds.   Neurological:      Mental Status: He is alert. He is not disoriented.      GCS: GCS eye subscore is 4. GCS verbal subscore is 5. GCS motor subscore is 6.      Sensory: No sensory deficit.   Psychiatric:         Attention and Perception: He is attentive.         Speech: Speech normal.         Behavior: Behavior normal.         XR WRIST COMPLETE 3 VIEWS LEFT    Result Date: 3/6/2023  EXAMINATION: XR WRIST COMPLETE 3 VIEWS LEFT CLINICAL HISTORY: Encounter for examination for insurance purposes TECHNIQUE: PA, lateral, and oblique views of the left wrist were performed. COMPARISON: None FINDINGS: No fracture or dislocation.  No bone destruction identified     See above Electronically signed by: Luis Hartman MD Date:    03/06/2023 Time:    13:47     Assessment:       1. Strain of left wrist, subsequent encounter    2. Encounter related to worker's compensation claim    3. Numbness and tingling of left thumb          Plan:         Patient instructed to hold naproxen while taking Medrol pack.  Encouraged to continue splinting and conduct range of motion exercises several times daily.  Instructed to apply ice 2-3 times daily for 30 minutes.  I will see patient back in 1 week or sooner as needed.  Consider occupational therapy if no significant improvement at next office visit.    Medications Ordered This Encounter   Medications    acetaminophen (TYLENOL EXTRA STRENGTH) 500 MG tablet     Sig: Take 2 tablets (1,000 mg total) by mouth every 6 (six) hours as needed for Pain.     Dispense:  60 tablet      Refill:  0    methylPREDNISolone (MEDROL DOSEPACK) 4 mg tablet     Sig: use as directed     Dispense:  1 each     Refill:  0     Patient Instructions: Use splint as directed (Apply ice 2-3 times daily for 30 minutes)   Restrictions: Limited use of left hand and arm, No driving company vehicles (No pushing, pulling or lifting > 5 lb.  with left hand)  Follow up in about 1 week (around 3/16/2023).

## 2023-03-09 NOTE — LETTER
Urgent Care - [address] [address]  Phone: [phone]  Fax: [phone]  [facility]: [id number]    Pt Name: Shade Neal Jr.  Injury Date: 03/02/2023   Employee ID:  Date of First Treatment: 03/09/2023   Company: [employer]       Appointment Time: 11:30 AM Arrived: 11:30 AM   Provider: Tony Gutierres PA-C Time Out: 12:10 PM     Office Treatment:   1. Strain of left wrist, subsequent encounter    2. Encounter related to worker's compensation claim    3. Numbness and tingling of left thumb      Medications Ordered This Encounter   Medications    acetaminophen (TYLENOL EXTRA STRENGTH) 500 MG tablet    methylPREDNISolone (MEDROL DOSEPACK) 4 mg tablet      Patient Instructions: Use splint as directed (Apply ice 2-3 times daily for 30 minutes)      Restrictions: Limited use of left hand and arm, No driving company vehicles (No pushing, pulling or lifting > 5 lb.  with left hand)     Return Appointment: 3/16/2023 at 10:30 AM    sulma

## 2023-03-09 NOTE — LETTER
Urgent Care - [address] [address]  Phone: [phone]  Fax: [phone]  [facility]: [id number]     Name: Shade Neal Jr.  Injury Date: 03/02/2023   Employee ID: [id number] Date of Treatment: 03/09/2023   Company: [employer]      Appointment Time: 11:30 AM Arrived: 11:30 AM   Provider: Tony Gutierres PA-C Time Out: 12:10 PM     Office Treatment:   1. Strain of left wrist, subsequent encounter    2. Encounter related to worker's compensation claim    3. Numbness and tingling of left thumb      Medications Ordered This Encounter   Medications    acetaminophen (TYLENOL EXTRA STRENGTH) 500 MG tablet    methylPREDNISolone (MEDROL DOSEPACK) 4 mg tablet      Patient Instructions: Use splint as directed (Apply ice 2-3 times daily for 30 minutes)      Restrictions: Limited use of left hand and arm (No pushing, pulling or lifting > 5 lb.  with left hand)     Return Appointment: 3/16/2023 at 10:30 AM    sulma

## 2023-03-14 ENCOUNTER — OFFICE VISIT (OUTPATIENT)
Dept: URGENT CARE | Facility: CLINIC | Age: 23
End: 2023-03-14
Payer: OTHER MISCELLANEOUS

## 2023-03-14 VITALS
SYSTOLIC BLOOD PRESSURE: 141 MMHG | DIASTOLIC BLOOD PRESSURE: 74 MMHG | HEART RATE: 70 BPM | TEMPERATURE: 98 F | OXYGEN SATURATION: 100 %

## 2023-03-14 DIAGNOSIS — S66.912D STRAIN OF LEFT WRIST, SUBSEQUENT ENCOUNTER: Primary | ICD-10-CM

## 2023-03-14 DIAGNOSIS — R20.0 NUMBNESS AND TINGLING OF LEFT THUMB: ICD-10-CM

## 2023-03-14 DIAGNOSIS — R20.2 NUMBNESS AND TINGLING OF LEFT THUMB: ICD-10-CM

## 2023-03-14 DIAGNOSIS — Z02.6 ENCOUNTER RELATED TO WORKER'S COMPENSATION CLAIM: ICD-10-CM

## 2023-03-14 PROCEDURE — 99213 OFFICE O/P EST LOW 20 MIN: CPT | Mod: S$GLB,,, | Performed by: PHYSICIAN ASSISTANT

## 2023-03-14 PROCEDURE — 99213 PR OFFICE/OUTPT VISIT, EST, LEVL III, 20-29 MIN: ICD-10-PCS | Mod: S$GLB,,, | Performed by: PHYSICIAN ASSISTANT

## 2023-03-14 NOTE — PROGRESS NOTES
Subjective:       Patient ID: Shade Neal Jr. is a 23 y.o. male.    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  Patient states he can not do it as his left hand and wrist are very painful and he is still getting numbness and tingling in the thumb.  Patient has a few days left on Medrol pack without improvement.  States he has been wearing his splint most of the time.  He reports aching pain in the wrist, worse with movement. MEB    Hand Injury   His dominant hand is their left hand. The incident occurred more than 1 week ago. The incident occurred at work. The injury mechanism was a direct blow. The quality of the pain is described as aching, burning, cramping, stabbing and shooting. The pain radiates to the left arm. The pain is at a severity of 10/10. The pain is moderate. The pain has been Constant since the incident. Associated symptoms include numbness and tingling. The symptoms are aggravated by movement. He has tried ice for the symptoms. The treatment provided mild relief.   Wrist Injury   Associated symptoms include numbness and tingling.     Constitution: Positive for activity change.   Musculoskeletal:  Positive for pain, joint pain and abnormal ROM of joint. Negative for joint swelling.   Skin:  Negative for color change and bruising.   Neurological:  Positive for numbness and tingling.      Objective:      Physical Exam  Vitals and nursing note reviewed.   Constitutional:       General: He is not in acute distress.     Appearance: He is well-developed. He is not diaphoretic.   HENT:       Head: Normocephalic and atraumatic.      Right Ear: Hearing and external ear normal.      Left Ear: Hearing and external ear normal.      Nose: Nose normal. No nasal deformity.   Eyes:      General: Lids are normal. No scleral icterus.     Conjunctiva/sclera: Conjunctivae normal.   Neck:      Trachea: Trachea normal.   Cardiovascular:      Pulses: Normal pulses.   Pulmonary:      Effort: Pulmonary effort is normal. No respiratory distress.      Breath sounds: No stridor.   Musculoskeletal:      Left wrist: Tenderness present. No swelling or deformity. Decreased range of motion (AROM:  Flexion 10°, extension 30°, ulnar deviation 5°, radial deviation 10°. FPROM, pt in tears with PROM).      Cervical back: Normal range of motion.      Comments: Generalized TTP left wrist, hand and fingers. No E/E/D. Light touch sensation intact all digits, cap refill < 2 sec.    Skin:     General: Skin is warm and dry.      Capillary Refill: Capillary refill takes less than 2 seconds.   Neurological:      Mental Status: He is alert. He is not disoriented.      GCS: GCS eye subscore is 4. GCS verbal subscore is 5. GCS motor subscore is 6.      Sensory: No sensory deficit.   Psychiatric:         Attention and Perception: He is attentive.         Speech: Speech normal.         Behavior: Behavior normal.       Assessment:       1. Strain of left wrist, subsequent encounter    2. Encounter related to worker's compensation claim    3. Numbness and tingling of left thumb          Plan:       Pain appears out of proportion.   Consider OT if no significant improvement at next office visit.     Patient Instructions: Daily home exercises/warm soaks, Use splint as directed   Restrictions:  (No use of left hand. Avoid driving.)  Follow up in about 1 week (around 3/21/2023).

## 2023-03-14 NOTE — LETTER
Urgent Care - [address] [address]  Phone: [phone]  Fax: [phone]  [facility]: [id number]     Name: Shade Neal Jr.  Injury Date: 03/02/2023   Employee ID: [id number] Date of Treatment: 03/14/2023   Company: [employer]      Appointment Time: 09:30 AM Arrived: 9:30AM   Provider: Tony Gutierres PA-C Time Out:10:45AM     Office Treatment:   1. Strain of left wrist, subsequent encounter    2. Encounter related to worker's compensation claim    3. Numbness and tingling of left thumb          Patient Instructions: Daily home exercises/warm soaks, Use splint as directed    Restrictions:  (No use of left hand. Avoid driving.)     Return Appointment: 3/21/2023 at 10:45AM    KW

## 2023-03-21 ENCOUNTER — OFFICE VISIT (OUTPATIENT)
Dept: URGENT CARE | Facility: CLINIC | Age: 23
End: 2023-03-21
Payer: OTHER MISCELLANEOUS

## 2023-03-21 VITALS
OXYGEN SATURATION: 100 % | SYSTOLIC BLOOD PRESSURE: 136 MMHG | DIASTOLIC BLOOD PRESSURE: 80 MMHG | TEMPERATURE: 98 F | HEART RATE: 83 BPM

## 2023-03-21 DIAGNOSIS — R20.2 NUMBNESS AND TINGLING OF LEFT THUMB: ICD-10-CM

## 2023-03-21 DIAGNOSIS — R20.0 NUMBNESS AND TINGLING OF LEFT THUMB: ICD-10-CM

## 2023-03-21 DIAGNOSIS — M79.642 HAND PAIN, LEFT: ICD-10-CM

## 2023-03-21 DIAGNOSIS — Z02.6 ENCOUNTER RELATED TO WORKER'S COMPENSATION CLAIM: ICD-10-CM

## 2023-03-21 DIAGNOSIS — S66.912D STRAIN OF LEFT WRIST, SUBSEQUENT ENCOUNTER: Primary | ICD-10-CM
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
## 2023-03-21 PROCEDURE — 99213 OFFICE O/P EST LOW 20 MIN: CPT | Mod: S$GLB,,, | Performed by: PHYSICIAN ASSISTANT

## 2023-03-21 PROCEDURE — 99213 PR OFFICE/OUTPT VISIT, EST, LEVL III, 20-29 MIN: ICD-10-PCS | Mod: S$GLB,,, | Performed by: PHYSICIAN ASSISTANT

## 2023-03-21 RX ORDER — ACETAMINOPHEN 500 MG
1000 TABLET ORAL EVERY 6 HOURS PRN
Qty: 60 TABLET | Refills: 0 | Status: SHIPPED | OUTPATIENT
Start: 2023-03-21 | End: 2023-04-04 | Stop reason: SDUPTHER

## 2023-03-21 RX ORDER — IBUPROFEN 600 MG/1
600 TABLET ORAL EVERY 6 HOURS PRN
Qty: 20 TABLET | Refills: 0 | Status: SHIPPED | OUTPATIENT
Start: 2023-03-21 | End: 2023-04-04 | Stop reason: ALTCHOICE

## 2023-03-21 NOTE — LETTER
Urgent Care - [address] [address]  Phone: [phone]  Fax: [phone]  [facility]: [id number]    Pt Name: Shade Neal Jr.  Injury Date: 03/02/2023   Employee ID: [id number] Date of Treatment: 03/21/2023   Company: [employer]      Appointment Time: 10:30 AM Arrived: 10:03 AM   Provider: Tony Gutierres PA-C Time Out: 11:30 AM     Office Treatment:   1. Strain of left wrist, subsequent encounter    2. Encounter related to worker's compensation claim    3. Numbness and tingling of left thumb    4. Work related injury    5. Hand pain, left      Medications Ordered This Encounter   Medications    acetaminophen (TYLENOL EXTRA STRENGTH) 500 MG tablet    ibuprofen (ADVIL,MOTRIN) 600 MG tablet      Patient Instructions: Daily home exercises/warm soaks, Use splint as directed (Occupational therapy to be scheduled once authorized.)      Restrictions:  (No use of left hand.  Avoid driving.)     Return Appointment: 4/4/2023 at 10:00 AM    sulma

## 2023-03-21 NOTE — PROGRESS NOTES
Subjective:       Patient ID: Shade Neal Jr. is a 23 y.o. male.    Chief Complaint: Wrist Injury (LEFT WRIST)    KW  Patient's place of employment - [employer]  Patient's job title - [employer]  Date of Injury - 03/02/2023  Body part injured - LEFT  WRIST  Current work status per last visit - LIGHT  Improved, same, or worse - SAME  Pain Scale right now (1-10) -  8    Patient presents for follow-up left wrist and hand injury.  He reports no significant improvement since last office visit.  He reports 8/10 pain in the left wrist and hand to the fingers.  Also reports numbness and tingling in the left thumb.  He reports his wrists swelled up after passive range of motion manipulation at last office visit.  Says it took a few days for the swelling to go down.  He is currently using a thumb spica splint, applying ice and taking naproxen and Tylenol with no relief. MEB    Wrist Injury   The incident occurred more than 1 week ago. The incident occurred at work. The injury mechanism was a direct blow. The pain is present in the left hand and left wrist. The quality of the pain is described as aching and stabbing. The pain radiates to the left hand. The pain is at a severity of 8/10. The pain is moderate. The pain has been Constant since the incident. Associated symptoms include numbness and tingling. The symptoms are aggravated by movement. He has tried heat for the symptoms. The treatment provided mild relief.     Constitution: Positive for activity change.   Musculoskeletal:  Positive for pain, joint pain, joint swelling and abnormal ROM of joint.   Neurological:  Positive for numbness and tingling.      Objective:      Physical Exam  Vitals and nursing note reviewed.   Constitutional:       General: He is not in acute distress.     Appearance: He is well-developed. He is not diaphoretic.   HENT:      Head: Normocephalic and atraumatic.      Right Ear: Hearing and external ear normal.      Left Ear: Hearing and  external ear normal.      Nose: Nose normal. No nasal deformity.   Eyes:      General: Lids are normal. No scleral icterus.     Conjunctiva/sclera: Conjunctivae normal.   Neck:      Trachea: Trachea normal.   Cardiovascular:      Pulses: Normal pulses.   Pulmonary:      Effort: Pulmonary effort is normal. No respiratory distress.      Breath sounds: No stridor.   Musculoskeletal:      Left wrist: Tenderness present. No swelling or deformity. Decreased range of motion (AROM:  Flexion 10°, extension 30°, ulnar deviation 5°, radial deviation 10°).      Cervical back: Normal range of motion.      Comments: Generalized TTP left wrist, hand and fingers. No E/E/D. Light touch sensation intact all digits, cap refill < 2 sec.    Skin:     General: Skin is warm and dry.      Capillary Refill: Capillary refill takes less than 2 seconds.   Neurological:      Mental Status: He is alert. He is not disoriented.      GCS: GCS eye subscore is 4. GCS verbal subscore is 5. GCS motor subscore is 6.      Sensory: No sensory deficit.   Psychiatric:         Attention and Perception: He is attentive.         Speech: Speech normal.         Behavior: Behavior normal.       Assessment:       1. Strain of left wrist, subsequent encounter    2. Encounter related to worker's compensation claim    3. Numbness and tingling of left thumb    4. Work related injury    5. Hand pain, left          Plan:           Patient with unusual presentation of wrist and entire hand tenderness and pain.  Forearm and elbow appear normal.  There is no swelling or deformity.  Patient has very limited range of motion of the wrist and hand along with tenderness from the wrist to tips of all fingers.  Patient encouraged to stop ice and start warm soaks.  Patient to continue thumb spica splint however removed splint several times daily and attempt range-of-motion exercises as tolerated.  I will order occupational therapy to eval and treat.  I will see patient back in 2  weeks or sooner as needed.    Medications Ordered This Encounter   Medications    acetaminophen (TYLENOL EXTRA STRENGTH) 500 MG tablet     Sig: Take 2 tablets (1,000 mg total) by mouth every 6 (six) hours as needed for Pain.     Dispense:  60 tablet     Refill:  0    ibuprofen (ADVIL,MOTRIN) 600 MG tablet     Sig: Take 1 tablet (600 mg total) by mouth every 6 (six) hours as needed for Pain.     Dispense:  20 tablet     Refill:  0     Patient Instructions: Daily home exercises/warm soaks, Use splint as directed (Occupational therapy to be scheduled once authorized.)   Restrictions:  (No use of left hand.  Avoid driving.)  Follow up in about 2 weeks (around 4/4/2023).

## 2023-04-04 ENCOUNTER — DOCUMENTATION ONLY (OUTPATIENT)
Dept: REHABILITATION | Facility: HOSPITAL | Age: 23
End: 2023-04-04
Payer: MEDICAID

## 2023-04-04 ENCOUNTER — OFFICE VISIT (OUTPATIENT)
Dept: URGENT CARE | Facility: CLINIC | Age: 23
End: 2023-04-04
Payer: OTHER MISCELLANEOUS

## 2023-04-04 ENCOUNTER — CLINICAL SUPPORT (OUTPATIENT)
Dept: REHABILITATION | Facility: HOSPITAL | Age: 23
End: 2023-04-04
Attending: PHYSICIAN ASSISTANT
Payer: OTHER MISCELLANEOUS

## 2023-04-04 VITALS
SYSTOLIC BLOOD PRESSURE: 106 MMHG | OXYGEN SATURATION: 100 % | TEMPERATURE: 98 F | HEART RATE: 62 BPM | DIASTOLIC BLOOD PRESSURE: 41 MMHG

## 2023-04-04 DIAGNOSIS — R20.2 NUMBNESS AND TINGLING OF LEFT THUMB: ICD-10-CM

## 2023-04-04 DIAGNOSIS — M25.60 RANGE OF MOTION DEFICIT: ICD-10-CM

## 2023-04-04 DIAGNOSIS — Z02.6 ENCOUNTER RELATED TO WORKER'S COMPENSATION CLAIM: ICD-10-CM
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
## 2023-04-04 DIAGNOSIS — R20.0 NUMBNESS AND TINGLING OF LEFT THUMB: ICD-10-CM

## 2023-04-04 DIAGNOSIS — S66.912D STRAIN OF LEFT WRIST, SUBSEQUENT ENCOUNTER: Primary | ICD-10-CM

## 2023-04-04 DIAGNOSIS — M25.532 WRIST PAIN, LEFT: ICD-10-CM

## 2023-04-04 PROCEDURE — 99213 PR OFFICE/OUTPT VISIT, EST, LEVL III, 20-29 MIN: ICD-10-PCS | Mod: S$GLB,,, | Performed by: PHYSICIAN ASSISTANT

## 2023-04-04 PROCEDURE — 97018 PARAFFIN BATH THERAPY: CPT

## 2023-04-04 PROCEDURE — 97165 OT EVAL LOW COMPLEX 30 MIN: CPT

## 2023-04-04 PROCEDURE — 97110 THERAPEUTIC EXERCISES: CPT

## 2023-04-04 PROCEDURE — 99213 OFFICE O/P EST LOW 20 MIN: CPT | Mod: S$GLB,,, | Performed by: PHYSICIAN ASSISTANT

## 2023-04-04 RX ORDER — NAPROXEN 500 MG/1
500 TABLET ORAL 2 TIMES DAILY WITH MEALS
Qty: 30 TABLET | Refills: 0 | OUTPATIENT
Start: 2023-04-04 | End: 2023-07-15

## 2023-04-04 RX ORDER — ACETAMINOPHEN 500 MG
1000 TABLET ORAL EVERY 6 HOURS PRN
Qty: 60 TABLET | Refills: 0 | Status: SHIPPED | OUTPATIENT
Start: 2023-04-04 | End: 2024-04-03

## 2023-04-04 NOTE — PROGRESS NOTES
Subjective:      Patient ID: Shade Neal Jr. is a 23 y.o. male.    Chief Complaint: Wrist Pain (L Wrist )    Patient's place of employment - [employer]   Patient's job title -    Date of Injury - 3/2/2023  Body part injured - L Wrist  Current work status per last visit - Disabled   Improved, same, or worse - Same.  Patient began P.T. today, 4/4/2023  Pain Scale right now (1-10) -  8-9/10    Ksd    Provider note:  Patient presents for follow-up left wrist and hand injury.  He reports slight improvement since last office visit.  He began occupational therapy today.  He reports pain about the wrist, hand and thumb.  He reports tingling and numbness in the thumb region.  He also reports intermittent swelling of the wrist and hand.  Patient has been using a splint, conducting home range-of-motion exercises and warm soaks. MEB    Wrist Pain   Associated symptoms include a limited range of motion and numbness.   Constitution: Positive for activity change.   Neck: Negative for neck pain.   Musculoskeletal:  Positive for pain, joint pain, joint swelling and abnormal ROM of joint. Negative for back pain.   Skin:  Negative for bruising.   Neurological:  Positive for numbness and tingling.   Objective:     Physical Exam  Vitals and nursing note reviewed.   Constitutional:       General: He is not in acute distress.     Appearance: He is well-developed. He is not diaphoretic.   HENT:      Head: Normocephalic and atraumatic.      Right Ear: Hearing and external ear normal.      Left Ear: Hearing and external ear normal.      Nose: Nose normal. No nasal deformity.   Eyes:      General: Lids are normal. No scleral icterus.     Conjunctiva/sclera: Conjunctivae normal.   Neck:      Trachea: Trachea normal.   Cardiovascular:      Pulses: Normal pulses.   Pulmonary:      Effort: Pulmonary effort is normal. No respiratory distress.      Breath sounds: No stridor.   Musculoskeletal:      Left wrist: Tenderness  present. No swelling or deformity. Decreased range of motion (AROM:  Flexion 10°, extension 30°, ulnar deviation 5°, radial deviation 10°).      Cervical back: Normal range of motion.      Comments: Generalized TTP left wrist, hand and fingers. No E/E/D. Light touch sensation intact all digits, cap refill < 2 sec.    Skin:     General: Skin is warm and dry.      Capillary Refill: Capillary refill takes less than 2 seconds.   Neurological:      Mental Status: He is alert. He is not disoriented.      GCS: GCS eye subscore is 4. GCS verbal subscore is 5. GCS motor subscore is 6.      Sensory: No sensory deficit.   Psychiatric:         Attention and Perception: He is attentive.         Speech: Speech normal.         Behavior: Behavior normal.      Assessment:      1. Strain of left wrist, subsequent encounter    2. Encounter related to worker's compensation claim    3. Numbness and tingling of left thumb    4. Work related injury      Plan:       Medications Ordered This Encounter   Medications    acetaminophen (TYLENOL EXTRA STRENGTH) 500 MG tablet     Sig: Take 2 tablets (1,000 mg total) by mouth every 6 (six) hours as needed for Pain.     Dispense:  60 tablet     Refill:  0    naproxen (NAPROSYN) 500 MG tablet     Sig: Take 1 tablet (500 mg total) by mouth 2 (two) times daily with meals.     Dispense:  30 tablet     Refill:  0     Patient Instructions: Attention not to aggravate affected area, Daily home exercises/warm soaks, Use splint as directed (Continue Occupational Therapy)   Restrictions: Limited use of left hand and arm, No lifting/pushing/pulling more than 10 lbs (Avoid driving)  Follow up in about 2 weeks (around 4/18/2023).

## 2023-04-04 NOTE — PLAN OF CARE
[facility]  Occupational Therapy Initial Evaluation    Date: 4/4/2023  Name: Shade Neal Jr.  Clinic Number: [id number]    Therapy Diagnosis:   Encounter Diagnoses   Name Primary?    Range of motion deficit     Wrist pain, left      Physician: Tony Gutierres PA-C    Physician Orders: eval and treat   Medical Diagnosis: Strain of left wrist   ICD-10:   S66.912D (ICD-10-CM) - Strain of left wrist, subsequent encounter   Y99.0 (ICD-10-CM) - Work related injury   M79.642 (ICD-10-CM) - Hand pain, left     Surgical Procedure and Date: N/A , N/A    OR Date of Injury/Onset: 3/2/23  Evaluation Date: 4/4/23    Plan of Care Expiration: 7/4/23  Date of Return to MD: 4/4/23  Visit # / Visits authorized: 1 / 9 WORKER'S COMP   Insurance Authorization Period Expiration: 3/20/24    FOTO 1: eval 82% limitation; 47% goal     Precautions:  Standard and Weightbearing    Time In: 8  Time Out: 845  Total Appointment Time (timed & untimed codes): 45 minutes      SUBJECTIVE     Date of Onset: 3/2/23    History of Current Condition/Mechanism of Injury: MD @ reports: The incident occurred more than 1 week ago. The incident occurred at work. The injury mechanism was a direct blow. The pain is present in the left hand and left wrist. The quality of the pain is described as aching and stabbing. The pain radiates to the left hand. The pain is at a severity of 8/10. The pain is moderate. The pain has been Constant since the incident. Associated symptoms include numbness and tingling. The symptoms are aggravated by movement. He has tried heat for the symptoms. The treatment provided mild relief.      Falls: Reports he slipped and fell on wet floor, breaking his fall by grabbing countertop with left hand/thumb, causing sudden pain and inability to leave watch on wrist. Went to EMT, urgent care and iced it    Involved Side: left   Dominant Side: left   Imaging:  see chart   Prior Therapy: none     Occupation:     Employer: [employer]   Working presently: employed- on leave w/injury   Duties: , load/unload stock, pushing/pulling rudy w/stock  reports boxes of 5-10# frequent lifting, carrying    Functional Limitations/Social History:    Previous functional status includes: Independent with all ADLs.     Current Functional Status   Home/Living environment: lives with their family      Limitation of Functional Status as follows:   ADLs/IADLs:     - Feeding: limited left hand use     - Bathing: limited left hand use     - Dressing/Grooming: limited left hand use     - Driving: limited left hand use     - Household Activities: limited left hand use for carrying, lifting,     - /pinch: limited  and pinch left hand for writing, carrying things, pushing, pulling, driving,      Leisure: n/a     Pain:  Functional Pain Scale Rating 0-10:   Current 8/10,   worst 10/10,   best 6/10   Location: left thumb, circumferential wrist   Description: throbbing, tight;   Aggravating Factors: movement   Easing Factors: brace,      Patient's Goals for Therapy: pain relief     Medical History:   Past Medical History:   Diagnosis Date    Hives     large whelps several times in the past    Laryngeal edema allergic agent unknown    history of allergic reactions       Surgical History:    has a past surgical history that includes Tympanoplasty.    Medications:   has a current medication list which includes the following prescription(s): acetaminophen, epinephrine, famotidine, naproxen, and ondansetron.    Allergies:   Review of patient's allergies indicates:  No Known Allergies       OBJECTIVE     Observation/Appearance: Patient without brace this date; maintains guarded posture and uses right hand for AAROM but reluctant to perform AROM on own due to pain.     Edema. Measured in centimeters.   4/4/2023    Left   MCP'S  19.4   PPC  20.3   PWC 15.8       Elbow and Wrist ROM. Measured in degrees.   4/4/2023    Left    Supination/Pronation Wnl/wnl   Wrist Ext/Flex 25/35   Wrist RD/UD 10/5   Pain w/ROM     Hand ROM. Measured in degrees.   4/4/2023    Left   Thumb: MP 0/35                IP 0/36       Rad ADD/ABD        Pal ADD/ABD           Opposition To small fingertip       Strength (Dynamometer) and Pinch Strength (Pinch Gauge)  Measured in pounds.   4/4/2023    Left   Rung II TBA   Key Pinch TBA   3pt Pinch TBA   2pt Pinch TBA     Sensation: intact     Manual Muscle Test:   FPL/B 3-/5   OP 3/5  EPL/B 3/5   APB 3/5   FCR 3-/5       Special Tests:   + TINELS wrist   Pain with resisted EPL with possible tenosynovitis of EPL, EDC, FCR  Mild laxity of thumb MP collateral ligaments but symmetrical to right side.   NO TFCC pain or instability noted.       Limitation/Restriction for FOTO Survey    Therapist reviewed FOTO scores for Shade VANESSA Neal Jr. on 4/4/2023.   FOTO documents entered into [employer] - see Media section.    Limitation Score: 82%; goal 47%          Treatment   Total Treatment time (time-based codes) separate from Evaluation: 25 minutes    Shade received the treatments listed below:     Supervised modalities after being cleared for contradictions: Paraffin bath - 10 min to left wrist to increase blood flow, circulation and tissue elasticity     Therapeutic exercises to develop strength, endurance, ROM, and flexibility for 15  minutes, including:  - blocking FPL, thumb flexion, opposition w/pinky slides, ab/ad and circumduction   - wrist flex/ext, RD, UD , sup/pron x 10 reps each, 3-5 x daily   -modality use for pain, stiffness, swelling   - brace use with activity   - added KT tape to radial / volar thumb and cross strap to wrist for support/stability but to improve mobility   - encouraged A/AAROM as tolerated     Patient Education and Home Exercises      Education provided:   - CONT HEP    Written Home Exercises Provided: yes.  Exercises were reviewed and Shade was able to demonstrate them prior to the end of the  session.  Shade demonstrated good  understanding of the education provided. See EMR under Patient Instructions for exercises provided during therapy sessions.     Pt was advised to perform these exercises free of pain, and to stop performing them if pain occurs.    Patient/Family Education: role of OT, goals for OT, scheduling/cancellations - pt verbalized understanding. Discussed insurance limitations with patient.    ASSESSMENT     Shade Neal Jr. is a 23 y.o. male referred to outpatient occupational therapy and presents with a medical diagnosis of left wrist sprain .  Patient presents with the following therapy deficits: Decreased ROM, Decreased  strength, Decreased pinch strength, Decreased muscle strength, Decreased functional hand use, Increased pain, Edema, and Joint Stiffness and demonstrates limitations as described in the chart below. Following medical record review it is determined that pt will benefit from occupational therapy services in order to maximize pain free and/or functional use of left hand . The following goals were discussed with the patient and patient is in agreement with them as to be addressed in the treatment plan. The patient's rehab potential is Good.     Anticipated barriers to occupational therapy: none   Pt has no cultural, educational or language barriers to learning provided.    Profile and History Assessment of Occupational Performance Level of Clinical Decision Making Complexity Score   Occupational Profile:   Shade Neal Jr. is a 23 y.o. male who lives with their family and is currently employed but on leave due to injury; Shade Neal Jr. has difficulty with  ADLs and IADLs as listed previously, which  Affecting hisdaily functional abilities.      Comorbidities:    has a past medical history of Hives and Laryngeal edema.    Medical and Therapy History Review:   Brief                Performance Deficits    Physical:  Joint Mobility  Joint  Stability  Muscle Power/Strength  Muscle Endurance  Edema   Strength  Pinch Strength  Gross Motor Coordination  Fine Motor Coordination  Pain    Cognitive:  No deficit noted     Psychosocial:    Habits, Routines, Rituals       Clinical Decision Making:  Low     Assessment Process:  Problem focuses assessment     Modification/Need for Assistance:  No Modifications Necessary     Intervention Selection:  Limited Treatment Options       Low   Based on PMHX, co morbidities , data from assessments and functional level of assistance required with task and clinical presentation directly impacting function.         Goals:   The following goals were discussed with the patient and patient is in agreement with them as to be addressed in the treatment plan.     Short Term Goals (STGs); to be met within 4 weeks   1)  Patient to be IND with HEP and modalities for pain management  2)  Increase ROM 3-10 degrees to increase functional hand use for ADLs/work/leisure activities  3)   Decrease edema .1-.5mm to increase joint mobility /flexibility for functional hand use.   4)  Assess  and pinch and set goals accordingly     Long Term Goals :To be met by discharge (12 weeks)   1)  Increase  strength 2-8 lbs. For lifting, carrying, driving and stocking.   2)  Increase pinch strength 1-4 psi's for writing, and FM activity   3)  Increase ROM 11-20 degrees to increase functional hand use for ADLs/work/leisure activities  4) Patient to score at 47%  or less on FOTO to demonstrate improved perception of functional left hand  Use.    PLAN   Plan of Care Certification: 4/4/2023 to 7/4/23.     Outpatient Occupational Therapy 1-2 times weekly for 6-12 weeks (9 visits approved initially)  to include the following interventions: Paraffin, Manual therapy/joint mobilizations, Modalities for pain management, US 3 mhz, Therapeutic exercises/activities., Strengthening, Orthotic Fabrication/Fit/Training, and Edema Control.    I CERTIFY THE  NEED FOR THESE SERVICES FURNISHED UNDER THIS PLAN OF TREATMENT AND WHILE UNDER MY CARE  Physician's comments:      Physician's Signature: ___________________________________________________      Alma Oviedo OT, T

## 2023-04-04 NOTE — LETTER
Urgent Care - [address] [address]  Phone: [phone]  Fax: [phone]  [facility]: [phone]     Name: Shade Neal Jr.  Injury Date: 03/02/2023   Employee ID: [id number] Date of Treatment: 04/04/2023   Company: [employer]      Appointment Time: 09:45 AM Arrived: 9:57 AM   Provider: Tony Gutierres PA-C Time Out: 10:48 AM     Office Treatment:   1. Strain of left wrist, subsequent encounter    2. Encounter related to worker's compensation claim    3. Numbness and tingling of left thumb    4. Work related injury      Medications Ordered This Encounter   Medications    acetaminophen (TYLENOL EXTRA STRENGTH) 500 MG tablet    naproxen (NAPROSYN) 500 MG tablet      Patient Instructions: Attention not to aggravate affected area, Daily home exercises/warm soaks, Use splint as directed (Continue Occupational Therapy)      Restrictions: Limited use of left hand and arm, No lifting/pushing/pulling more than 10 lbs (Avoid driving)     Return Appointment: 4/18/2023 at 9:30 AM    sulma

## 2023-04-04 NOTE — PATIENT INSTRUCTIONS
"[facility] - OCCUPATIONAL THERAPY  HOME EXERCISE PROGRAM   Soak hand in hot water or use hot wet compress for 5-10 min before the exercise or in the morning to decrease stiffness.     Cold pack x 10 min at night for pain, swelling.     Complete the following exercises with 10 repetitions each, 3-5x/day.     AROM: Thumb IP Flexion / Extension  Brace thumb below tip joint. Bend joint as far as possible then straighten.    AROM: Thumb Composite Flexion   Bend both joints of thumb as far as possible. Try to touch base of little finger.    AROM: Radial Adduction / Abduction  Place your palm flat on the table. Move thumb out to side. Move back alongside index finger.                                    AROM: Composite Movement Circumduction  Make clockwise circles with thumb. Reverse and make counterclockwise circles with thumb.                AROM: Composite Flesion ("Pinky Slides")  Touch thumb to tip of small finger. Slide thumb down small finger into palm.     AROM: Supination / Pronation   With your elbow by your side, turn your palm up then turn your palm down.     AROM: Wrist Flexion / Extension               Bend your wrist forward and back as far as possible.      AROM: Wrist Radial / Ulnar Deviation  Bend your wrist from side to side as far as possible.        AROM: Wrist Radial / Ulnar Deviation   Make a fist then bend your wrist toward your body, then away.         Copyright © I. All rights reserved.     Therapist: MORTEZA Esquivel CHT    Copyright © I. All rights reserved.     "

## 2023-04-04 NOTE — PROGRESS NOTES
NO Show or Cancellation Documentation only     Patient:  Shade Neal Jr.  Date of visit: 4/4/23  MRN: [id number]    Shade Neal Jr. did not show up for his/her OT Hand Therapy Appointment as scheduled today.  They did not call or cancel their appointment prior to today.  No charges were posted this date.  He is a worker's comp patient; we will attempt to reschedule the patient.     MORTEZA Smith, CHT

## 2023-04-13 ENCOUNTER — CLINICAL SUPPORT (OUTPATIENT)
Dept: REHABILITATION | Facility: HOSPITAL | Age: 23
End: 2023-04-13
Payer: OTHER MISCELLANEOUS

## 2023-04-13 DIAGNOSIS — M25.60 RANGE OF MOTION DEFICIT: Primary | ICD-10-CM

## 2023-04-13 DIAGNOSIS — M25.532 WRIST PAIN, LEFT: ICD-10-CM

## 2023-04-13 PROCEDURE — 97035 APP MDLTY 1+ULTRASOUND EA 15: CPT

## 2023-04-13 PROCEDURE — 97530 THERAPEUTIC ACTIVITIES: CPT

## 2023-04-13 NOTE — PROGRESS NOTES
"  Occupational Therapy Daily Treatment Note     Name: Shade Neal Jr.  Clinic Number: [id number]    Therapy Diagnosis:   Encounter Diagnoses   Name Primary?    Range of motion deficit Yes    Wrist pain, left      Physician: Tony Gutierres PA-C    Visit Date: 4/13/2023    Physician Orders: eval and treat   Medical Diagnosis: Strain of left wrist   ICD-10:   S66.912D (ICD-10-CM) - Strain of left wrist, subsequent encounter   Y99.0 (ICD-10-CM) - Work related injury   M79.642 (ICD-10-CM) - Hand pain, left      Surgical Procedure and Date: N/A , N/A    OR Date of Injury/Onset: 3/2/23  Evaluation Date: 4/4/23     Plan of Care Expiration: 7/4/23  Date of Return to MD: 4/4/23  Visit # / Visits authorized: 1 / 9 WORKER'S COMP   Insurance Authorization Period Expiration: 3/20/24     FOTO 1: eval 82% limitation; 47% goal      Precautions:  Standard and Weightbearing    Time In:8:20  Time Out: 8:45  Total Billable Time: 25 minutes    Precautions:  Standard      Subjective   Pt arrived 20 minutes late to session.     Pt reports: He arrives without his thumb/wrist brace today. "my thumb is still numb and tingly. I've been doing the exercises."   he was compliant with home exercise program given last session.   Response to previous treatment: no adverse reactions   Functional change: he remains limited by pain/brace use     Pain: 7/10 with movement.   6/10 at rest   Location: Left wrist     Objective       Observation/Appearance: Patient without brace this date; maintains guarded posture and uses right hand for AAROM but reluctant to perform AROM on own due to pain.      Edema. Measured in centimeters.    4/4/2023     Left   MCP'S  19.4   PPC  20.3   PWC 15.8         Elbow and Wrist ROM. Measured in degrees.    4/4/2023     Left   Supination/Pronation Wnl/wnl   Wrist Ext/Flex 25/35   Wrist RD/UD 10/5   Pain w/ROM      Hand ROM. Measured in degrees.    4/4/2023     Left   Thumb: MP 0/35                IP 0/36       Rad " ADD/ABD         Pal ADD/ABD            Opposition To small fingertip        Strength (Dynamometer) and Pinch Strength (Pinch Gauge)  Measured in pounds.    4/4/2023     Left   Rung II TBA   Key Pinch TBA   3pt Pinch TBA   2pt Pinch TBA      Sensation: intact      Manual Muscle Test:   FPL/B 3-/5   OP 3/5  EPL/B 3/5   APB 3/5   FCR 3-/5         Special Tests:   + TINELS wrist   Pain with resisted EPL with possible tenosynovitis of EPL, EDC, FCR  Mild laxity of thumb MP collateral ligaments but symmetrical to right side.   NO TFCC pain or instability noted.         Limitation/Restriction for FOTO Survey     Therapist reviewed FOTO scores for Shade VANESSA Neal Jr. on 4/4/2023.   FOTO documents entered into [employer] - see Media section.     Limitation Score: 82%; goal 47%            Treatment     Pt 20 minutes late today, Tx time 25 mins:       Ultrasound - to thumb and wrist area to increase blood flow, circulation, tissue elasticity, pain management and for wound/scar management for 8 minutes @ 3.3 Mhz, Intensity 0.6 w/cm2 at 100% duty cycle.      Therapeutic activities to develop strength, endurance, ROM, and flexibility for 12  minutes, including:  - STM to thumb and wrist   - blocking FPL, thumb flexion, opposition w/pinky slides, ab/ad and circumduction   - wrist flex/ext, RD, UD , sup/pron x 10 reps each, 3-5 x daily   -modality use for pain, stiffness, swelling   - brace use with activity   - added KT tape to radial / volar thumb and cross strap to wrist for support/stability but to improve mobility   - encouraged A/AAROM as tolerated       Home Exercises and Education Provided     Education provided:   - Keep up with brace. Allow for rest and healing of structures.   - Progress towards goals     Written Home Exercises Provided: Patient instructed to cont prior HEP.  Exercises were reviewed and Shade was able to demonstrate them prior to the end of the session.  Shade demonstrated good  understanding of the HEP  provided.   .   See EMR under Patient Instructions for exercises provided prior visit.        Assessment     Pt tolerated OT session well today. He reports relief of symptoms with tape. Responded well to ultrasound and STM. Reviewed precautions and healing - rest and allow for healing of structures.     Shade is progressing well towards his goals and there are no updates to goals at this time. Pt prognosis is Good.     Pt will continue to benefit from skilled outpatient occupational therapy to address the deficits listed in the problem list on initial evaluation provide pt/family education and to maximize pt's level of independence in the home and community environment.     Anticipated barriers to occupational therapy: none     Pt's spiritual, cultural and educational needs considered and pt agreeable to plan of care and goals.    Goals:   The following goals were discussed with the patient and patient is in agreement with them as to be addressed in the treatment plan.      Short Term Goals (STGs); to be met within 4 weeks   1)  Patient to be IND with HEP and modalities for pain management  -Ongoing   2)  Increase ROM 3-10 degrees to increase functional hand use for ADLs/work/leisure activities  -Ongoing   3)   Decrease edema .1-.5mm to increase joint mobility /flexibility for functional hand use.   -Ongoing   4)  Assess  and pinch and set goals accordingly   -Ongoing      Long Term Goals :To be met by discharge (12 weeks)   1)  Increase  strength 2-8 lbs. For lifting, carrying, driving and stocking.   -Ongoing   2)  Increase pinch strength 1-4 psi's for writing, and FM activity   -Ongoing   3)  Increase ROM 11-20 degrees to increase functional hand use for ADLs/work/leisure activities  -Ongoing   4) Patient to score at 47%  or less on FOTO to demonstrate improved perception of functional left hand  Use.  -Ongoing      PLAN     Plan of Care Certification: 4/4/2023 to 7/4/23.      Outpatient Occupational  Therapy 1-2 times weekly for 6-12 weeks (9 visits approved initially)  to include the following interventions: Paraffin, Manual therapy/joint mobilizations, Modalities for pain management, US 3 mhz, Therapeutic exercises/activities., Strengthening, Orthotic Fabrication/Fit/Training, and Edema Control.      Nita Anguiano, OT

## 2023-04-19 ENCOUNTER — TELEPHONE (OUTPATIENT)
Dept: URGENT CARE | Facility: CLINIC | Age: 23
End: 2023-04-19
Payer: OTHER MISCELLANEOUS

## 2023-04-19 NOTE — TELEPHONE ENCOUNTER
Called patient and left a voice message and call back number regarding the missed [facility] health appointment.    AFG

## 2023-04-20 ENCOUNTER — DOCUMENTATION ONLY (OUTPATIENT)
Dept: REHABILITATION | Facility: HOSPITAL | Age: 23
End: 2023-04-20
Payer: OTHER MISCELLANEOUS

## 2023-04-20 NOTE — PROGRESS NOTES
NO Show or Cancellation Documentation only      Patient:  Shade Neal Jr.   Date of visit: 4/20/23  MRN: [id number]     Shade did not show up for his OT Hand Therapy Appointment as scheduled today.  They did not call or cancel their appointment prior to today.  No charges were posted this date.  He is scheduled for a follow up on 4/21/23.     RISHI Palafox/PATITO

## 2023-04-25 ENCOUNTER — DOCUMENTATION ONLY (OUTPATIENT)
Dept: REHABILITATION | Facility: HOSPITAL | Age: 23
End: 2023-04-25
Payer: OTHER MISCELLANEOUS

## 2023-04-25 NOTE — PROGRESS NOTES
NO Show or Cancellation Documentation only     Patient:  Shade Neal Jr.  Date of visit: 4/25/23  MRN: [id number]    Shade Neal Jr. did not show up for his/her OT Hand Therapy Appointment as scheduled today.  They did not call or cancel their appointment prior to today.  No charges were posted this date.  He also did not show up for his appointments on 4/20/23, and 4/21/23.  He has no showed for last three visits; He may be considered for discharge due to non attendance.     MORTEZA Smith, CHT     left normal/right normal

## 2023-04-27 ENCOUNTER — DOCUMENTATION ONLY (OUTPATIENT)
Dept: REHABILITATION | Facility: HOSPITAL | Age: 23
End: 2023-04-27
Payer: OTHER MISCELLANEOUS

## 2023-04-27 NOTE — PROGRESS NOTES
NO Show or Cancellation Documentation only      Patient:  Shade Neal Jr.  Date of visit: 4/27/23  MRN: [id number]     Shade Neal Jr. did not show up for his/her OT Hand Therapy Appointment as scheduled today.  They did not call or cancel their appointment prior to today.  No charges were posted this date.  He also did not show up for his appointments on 4/20/23, 4/21/23 and 4/25/23.  He may be considered for discharge due to non attendance.      MORTEZA Smith, CHT

## 2023-05-04 ENCOUNTER — DOCUMENTATION ONLY (OUTPATIENT)
Dept: REHABILITATION | Facility: HOSPITAL | Age: 23
End: 2023-05-04
Payer: OTHER MISCELLANEOUS

## 2023-05-04 NOTE — PROGRESS NOTES
NO Show or Cancellation Documentation only      Patient:  Shade Neal Jr.  Date of visit: 5/4/23  MRN: [id number]     Shade Neal Jr. did not show up for his/her OT Hand Therapy Appointment as scheduled today.  They did not call or cancel their appointment prior to today.  No charges were posted this date.  He also did not show up for his appointments on 4/20/23, 4/21/23 and 4/25/23 and 4/27/23.  He may be considered for discharge due to non attendance.     MORTEZA Esquivel, CHT

## 2023-06-07 ENCOUNTER — HOSPITAL ENCOUNTER (EMERGENCY)
Facility: HOSPITAL | Age: 23
Discharge: HOME | End: 2023-06-07
Attending: EMERGENCY MEDICINE
Payer: MEDICAID

## 2023-06-07 VITALS
WEIGHT: 130 LBS | HEIGHT: 71 IN | OXYGEN SATURATION: 100 % | HEART RATE: 60 BPM | TEMPERATURE: 98 F | BODY MASS INDEX: 18.2 KG/M2 | DIASTOLIC BLOOD PRESSURE: 70 MMHG | RESPIRATION RATE: 12 BRPM | SYSTOLIC BLOOD PRESSURE: 110 MMHG

## 2023-06-07 DIAGNOSIS — R11.2 NAUSEA & VOMITING: Primary | ICD-10-CM

## 2023-06-07 LAB
ALBUMIN SERPL BCP-MCNC: 4.8 G/DL (ref 3.5–5.2)
ALP SERPL-CCNC: 72 U/L (ref 55–135)
ALT SERPL W/O P-5'-P-CCNC: 16 U/L (ref 10–44)
ANION GAP SERPL CALC-SCNC: 14 MMOL/L (ref 8–16)
AST SERPL-CCNC: 20 U/L (ref 10–40)
BASOPHILS # BLD AUTO: 0.01 K/UL (ref 0–0.2)
BASOPHILS NFR BLD: 0.2 % (ref 0–1.9)
BILIRUB SERPL-MCNC: 1.2 MG/DL (ref 0.1–1)
BUN SERPL-MCNC: 9 MG/DL (ref 6–20)
CALCIUM SERPL-MCNC: 9.6 MG/DL (ref 8.7–10.5)
CHLORIDE SERPL-SCNC: 104 MMOL/L (ref 95–110)
CO2 SERPL-SCNC: 22 MMOL/L (ref 23–29)
CREAT SERPL-MCNC: 0.9 MG/DL (ref 0.5–1.4)
DIFFERENTIAL METHOD: NORMAL
EOSINOPHIL # BLD AUTO: 0 K/UL (ref 0–0.5)
EOSINOPHIL NFR BLD: 0.5 % (ref 0–8)
ERYTHROCYTE [DISTWIDTH] IN BLOOD BY AUTOMATED COUNT: 12.4 % (ref 11.5–14.5)
EST. GFR  (NO RACE VARIABLE): >60 ML/MIN/1.73 M^2
GLUCOSE SERPL-MCNC: 111 MG/DL (ref 70–110)
HCT VFR BLD AUTO: 43.2 % (ref 40–54)
HGB BLD-MCNC: 14.5 G/DL (ref 14–18)
IMM GRANULOCYTES # BLD AUTO: 0.01 K/UL (ref 0–0.04)
IMM GRANULOCYTES NFR BLD AUTO: 0.2 % (ref 0–0.5)
LIPASE SERPL-CCNC: 10 U/L (ref 4–60)
LYMPHOCYTES # BLD AUTO: 1.5 K/UL (ref 1–4.8)
LYMPHOCYTES NFR BLD: 22.5 % (ref 18–48)
MCH RBC QN AUTO: 29.8 PG (ref 27–31)
MCHC RBC AUTO-ENTMCNC: 33.6 G/DL (ref 32–36)
MCV RBC AUTO: 89 FL (ref 82–98)
MONOCYTES # BLD AUTO: 0.3 K/UL (ref 0.3–1)
MONOCYTES NFR BLD: 4.4 % (ref 4–15)
NEUTROPHILS # BLD AUTO: 4.7 K/UL (ref 1.8–7.7)
NEUTROPHILS NFR BLD: 72.2 % (ref 38–73)
NRBC BLD-RTO: 0 /100 WBC
PLATELET # BLD AUTO: 217 K/UL (ref 150–450)
PMV BLD AUTO: 9.9 FL (ref 9.2–12.9)
POTASSIUM SERPL-SCNC: 3.8 MMOL/L (ref 3.5–5.1)
PROT SERPL-MCNC: 7.7 G/DL (ref 6–8.4)
RBC # BLD AUTO: 4.87 M/UL (ref 4.6–6.2)
SODIUM SERPL-SCNC: 140 MMOL/L (ref 136–145)
WBC # BLD AUTO: 6.52 K/UL (ref 3.9–12.7)

## 2023-06-07 PROCEDURE — 25000003 PHARM REV CODE 250: Performed by: PHYSICIAN ASSISTANT

## 2023-06-07 PROCEDURE — 63600175 PHARM REV CODE 636 W HCPCS: Performed by: PHYSICIAN ASSISTANT

## 2023-06-07 PROCEDURE — 99284 EMERGENCY DEPT VISIT MOD MDM: CPT | Mod: 25

## 2023-06-07 PROCEDURE — 85025 COMPLETE CBC W/AUTO DIFF WBC: CPT | Performed by: PHYSICIAN ASSISTANT

## 2023-06-07 PROCEDURE — 96374 THER/PROPH/DIAG INJ IV PUSH: CPT

## 2023-06-07 PROCEDURE — 96375 TX/PRO/DX INJ NEW DRUG ADDON: CPT

## 2023-06-07 PROCEDURE — 96372 THER/PROPH/DIAG INJ SC/IM: CPT | Performed by: PHYSICIAN ASSISTANT

## 2023-06-07 PROCEDURE — 80053 COMPREHEN METABOLIC PANEL: CPT | Performed by: PHYSICIAN ASSISTANT

## 2023-06-07 PROCEDURE — 83690 ASSAY OF LIPASE: CPT | Performed by: PHYSICIAN ASSISTANT

## 2023-06-07 PROCEDURE — 96361 HYDRATE IV INFUSION ADD-ON: CPT

## 2023-06-07 RX ORDER — ONDANSETRON 4 MG/1
4 TABLET, ORALLY DISINTEGRATING ORAL EVERY 6 HOURS PRN
Qty: 12 TABLET | Refills: 0 | OUTPATIENT
Start: 2023-06-07 | End: 2023-07-15

## 2023-06-07 RX ORDER — ONDANSETRON 2 MG/ML
4 INJECTION INTRAMUSCULAR; INTRAVENOUS
Status: COMPLETED | OUTPATIENT
Start: 2023-06-07 | End: 2023-06-07

## 2023-06-07 RX ORDER — DROPERIDOL 2.5 MG/ML
2.5 INJECTION, SOLUTION INTRAMUSCULAR; INTRAVENOUS ONCE
Status: COMPLETED | OUTPATIENT
Start: 2023-06-07 | End: 2023-06-07

## 2023-06-07 RX ORDER — DICYCLOMINE HYDROCHLORIDE 20 MG/1
20 TABLET ORAL 4 TIMES DAILY PRN
Qty: 20 TABLET | Refills: 0 | Status: SHIPPED | OUTPATIENT
Start: 2023-06-07 | End: 2023-07-07

## 2023-06-07 RX ORDER — FAMOTIDINE 10 MG/ML
20 INJECTION INTRAVENOUS
Status: COMPLETED | OUTPATIENT
Start: 2023-06-07 | End: 2023-06-07

## 2023-06-07 RX ORDER — FAMOTIDINE 20 MG/1
20 TABLET, FILM COATED ORAL 2 TIMES DAILY
Qty: 28 TABLET | Refills: 0 | OUTPATIENT
Start: 2023-06-07 | End: 2023-07-15

## 2023-06-07 RX ADMIN — ONDANSETRON 4 MG: 2 INJECTION INTRAMUSCULAR; INTRAVENOUS at 02:06

## 2023-06-07 RX ADMIN — DROPERIDOL 2.5 MG: 2.5 INJECTION, SOLUTION INTRAMUSCULAR; INTRAVENOUS at 01:06

## 2023-06-07 RX ADMIN — FAMOTIDINE 20 MG: 10 INJECTION INTRAVENOUS at 02:06

## 2023-06-07 RX ADMIN — SODIUM CHLORIDE, POTASSIUM CHLORIDE, SODIUM LACTATE AND CALCIUM CHLORIDE 1000 ML: 600; 310; 30; 20 INJECTION, SOLUTION INTRAVENOUS at 01:06

## 2023-06-07 NOTE — DISCHARGE INSTRUCTIONS
BRAT diet, progress as tolerated. Zofran as needed for nausea. Pepcid twice daily. Bentyl as needed for abdominal cramping/pain.     Follow-up and establish care with a local primary care provider for re-evaluation should your symptoms persist.    Return to this ED if you experience worsening abdominal pain, if you continue with frequent vomiting, if you feel lightheaded or feel as if you are going to pass out, if unable to eat or drink, if any other problems occur.

## 2023-06-07 NOTE — ED TRIAGE NOTES
Shade VANESSA Neal Jr., a 23 y.o. male presents to the ED w/ complaint of 10/10 ABD since 2100 and constipation x 1 day.     Triage note:  Chief Complaint   Patient presents with    Abdominal Pain     Patient presents to ED via [facility] secondary to abdominal pain accompanied by nausea and vomiting beginning at 2100.      Review of patient's allergies indicates:  No Known Allergies  Past Medical History:   Diagnosis Date    Hives     large whelps several times in the past    Laryngeal edema allergic agent unknown    history of allergic reactions

## 2023-06-07 NOTE — ED PROVIDER NOTES
Encounter Date: 6/7/2023       History     Chief Complaint   Patient presents with    Abdominal Pain     Patient presents to ED [facility] secondary to abdominal pain accompanied by nausea and vomiting beginning at 2100.      24yo M smoker presents to ED with chief complaint abdominal pain, n/v which began acutely around 9:00 p.m. this evening.    Patient states has been in usual state of health without any recent illness.  States she was at home when he began with acute onset nausea, multiple episodes of nonbloody emesis, generalized abdominal pain. Patient denies history of similar symptoms.  States he had some Pepto-Bismol without relief.  Normal BMs. No trauma.  No urinary complaints.  No cough.  No fever.    No history of any abdominal surgeries    Denies any significant past medical history  Daily marijuana; states has not smoked in the past 2-3 days.    Review of patient's allergies indicates:  No Known Allergies  Past Medical History:   Diagnosis Date    Hives     large whelps several times in the past    Laryngeal edema allergic agent unknown    history of allergic reactions     Past Surgical History:   Procedure Laterality Date    TYMPANOPLASTY      right ear     Family History   Problem Relation Age of Onset    Stroke Maternal Grandfather     Hypertension Maternal Grandfather     Hypertension Paternal Grandfather      Social History     Tobacco Use    Smoking status: Never     Passive exposure: Never    Smokeless tobacco: Never   Substance Use Topics    Alcohol use: No    Drug use: No     Review of Systems   Constitutional:  Negative for fever.   Respiratory:  Negative for cough.    Gastrointestinal:  Positive for abdominal pain, nausea and vomiting.   Genitourinary:  Negative for dysuria.   Neurological:  Negative for syncope.   Psychiatric/Behavioral:  The patient is nervous/anxious.      Physical Exam     Initial Vitals [06/07/23 0113]   BP Pulse Resp Temp SpO2   133/81 61 16 97.9 °F (36.6  °C) 100 %      MAP       --         Physical Exam    Nursing note and vitals reviewed.  Constitutional: He appears well-developed and well-nourished. He is not diaphoretic.   Uncomfortable, nontoxic   HENT:   Head: Normocephalic and atraumatic.   Neck: Neck supple.   Normal range of motion.  Cardiovascular:  Intact distal pulses.           1+ DP bilaterally   Pulmonary/Chest: No respiratory distress.   Abdominal: Abdomen is soft. Bowel sounds are normal. He exhibits no distension. There is no abdominal tenderness.   Musculoskeletal:      Cervical back: Normal range of motion and neck supple.     Neurological: He is alert and oriented to person, place, and time. GCS score is 15. GCS eye subscore is 4. GCS verbal subscore is 5. GCS motor subscore is 6.   Skin: Skin is warm. Capillary refill takes less than 2 seconds.   Psychiatric: Thought content normal.   Anxious, tearful, akathisia       ED Course   Procedures  Labs Reviewed   COMPREHENSIVE METABOLIC PANEL - Abnormal; Notable for the following components:       Result Value    CO2 22 (*)     Glucose 111 (*)     Total Bilirubin 1.2 (*)     All other components within normal limits   CBC W/ AUTO DIFFERENTIAL   LIPASE   URINALYSIS, REFLEX TO URINE CULTURE          Imaging Results    None          Medications   lactated ringers bolus 1,000 mL (1,000 mLs Intravenous New Bag 6/7/23 0156)   droPERidol injection 2.5 mg (2.5 mg Intramuscular Given 6/7/23 0154)   ondansetron injection 4 mg (4 mg Intravenous Given 6/7/23 0206)   famotidine (PF) injection 20 mg (20 mg Intravenous Given 6/7/23 0206)     Medical Decision Making:   Differential Diagnosis:   Dehydration, GERD, gastritis, small-bowel obstruction, aortic dissection, cannabinoid hyperemesis, cyclical vomiting  Clinical Tests:   Lab Tests: Ordered and Reviewed           ED Course as of 06/07/23 0252 Wed Jun 07, 2023 0251 Nausea resolved on re-evaluation.  Pain improved, no abdominal tenderness at any time.  After  discussion with patient, he feels comfortable with discharge and outpatient follow-up.  Zofran p.r.n., Bentyl as needed for discomfort, Pepcid b.i.d., red flags and return precautions discussed. [SM]      ED Course User Index  [SM] Demetirs Lara PA-C                 Clinical Impression:   Final diagnoses:  [R11.2] Nausea & vomiting (Primary)        ED Disposition Condition    Discharge Stable          ED Prescriptions       Medication Sig Dispense Start Date End Date Auth. Provider    dicyclomine (BENTYL) 20 mg tablet Take 1 tablet (20 mg total) by mouth 4 (four) times daily as needed (abdominal cramping). 20 tablet 6/7/2023 7/7/2023 Demetris Lara PA-C    ondansetron (ZOFRAN-ODT) 4 MG TbDL Take 1 tablet (4 mg total) by mouth every 6 (six) hours as needed (nausea). 12 tablet 6/7/2023 -- Demetris Lara PA-C    famotidine (PEPCID) 20 MG tablet Take 1 tablet (20 mg total) by mouth 2 (two) times daily. for 14 days 28 tablet 6/7/2023 6/21/2023 Demetris Lara PA-C          Follow-up Information       Follow up With Specialties Details Why Contact Info    [facility]  Schedule an appointment as soon as possible for a visit  To establish primary care physician, for reevaluation [address] [place] [address]  [phone]               Demetris Lara PA-C  06/07/23 0252

## 2023-07-15 ENCOUNTER — HOSPITAL ENCOUNTER (EMERGENCY)
Facility: HOSPITAL | Age: 23
Discharge: HOME | End: 2023-07-15
Attending: STUDENT IN AN ORGANIZED HEALTH CARE EDUCATION/TRAINING PROGRAM
Payer: MEDICAID

## 2023-07-15 VITALS
SYSTOLIC BLOOD PRESSURE: 113 MMHG | HEIGHT: 71 IN | TEMPERATURE: 98 F | WEIGHT: 130 LBS | DIASTOLIC BLOOD PRESSURE: 55 MMHG | HEART RATE: 57 BPM | BODY MASS INDEX: 18.2 KG/M2 | OXYGEN SATURATION: 100 % | RESPIRATION RATE: 16 BRPM

## 2023-07-15 DIAGNOSIS — N20.0 KIDNEY STONE: Primary | ICD-10-CM

## 2023-07-15 LAB
ALBUMIN SERPL BCP-MCNC: 5.1 G/DL (ref 3.5–5.2)
ALP SERPL-CCNC: 75 U/L (ref 55–135)
ALT SERPL W/O P-5'-P-CCNC: 17 U/L (ref 10–44)
ANION GAP SERPL CALC-SCNC: 11 MMOL/L (ref 8–16)
AST SERPL-CCNC: 16 U/L (ref 10–40)
BACTERIA #/AREA URNS HPF: ABNORMAL /HPF
BASOPHILS # BLD AUTO: 0.03 K/UL (ref 0–0.2)
BASOPHILS NFR BLD: 0.6 % (ref 0–1.9)
BILIRUB SERPL-MCNC: 1.2 MG/DL (ref 0.1–1)
BILIRUB UR QL STRIP: NEGATIVE
BUN SERPL-MCNC: 6 MG/DL (ref 6–20)
CALCIUM SERPL-MCNC: 10.1 MG/DL (ref 8.7–10.5)
CAOX CRY URNS QL MICRO: ABNORMAL
CHLORIDE SERPL-SCNC: 104 MMOL/L (ref 95–110)
CLARITY UR: CLEAR
CO2 SERPL-SCNC: 26 MMOL/L (ref 23–29)
COLOR UR: YELLOW
CREAT SERPL-MCNC: 1.2 MG/DL (ref 0.5–1.4)
DIFFERENTIAL METHOD: NORMAL
EOSINOPHIL # BLD AUTO: 0.1 K/UL (ref 0–0.5)
EOSINOPHIL NFR BLD: 1 % (ref 0–8)
ERYTHROCYTE [DISTWIDTH] IN BLOOD BY AUTOMATED COUNT: 13 % (ref 11.5–14.5)
EST. GFR  (NO RACE VARIABLE): >60 ML/MIN/1.73 M^2
GLUCOSE SERPL-MCNC: 124 MG/DL (ref 70–110)
GLUCOSE UR QL STRIP: NEGATIVE
HCT VFR BLD AUTO: 45.3 % (ref 40–54)
HGB BLD-MCNC: 14.5 G/DL (ref 14–18)
HGB UR QL STRIP: ABNORMAL
HYALINE CASTS #/AREA URNS LPF: 0 /LPF
IMM GRANULOCYTES # BLD AUTO: 0.01 K/UL (ref 0–0.04)
IMM GRANULOCYTES NFR BLD AUTO: 0.2 % (ref 0–0.5)
KETONES UR QL STRIP: NEGATIVE
LEUKOCYTE ESTERASE UR QL STRIP: NEGATIVE
LIPASE SERPL-CCNC: 13 U/L (ref 4–60)
LYMPHOCYTES # BLD AUTO: 1.8 K/UL (ref 1–4.8)
LYMPHOCYTES NFR BLD: 35.1 % (ref 18–48)
MCH RBC QN AUTO: 29.4 PG (ref 27–31)
MCHC RBC AUTO-ENTMCNC: 32 G/DL (ref 32–36)
MCV RBC AUTO: 92 FL (ref 82–98)
MICROSCOPIC COMMENT: ABNORMAL
MONOCYTES # BLD AUTO: 0.5 K/UL (ref 0.3–1)
MONOCYTES NFR BLD: 9.2 % (ref 4–15)
NEUTROPHILS # BLD AUTO: 2.8 K/UL (ref 1.8–7.7)
NEUTROPHILS NFR BLD: 53.9 % (ref 38–73)
NITRITE UR QL STRIP: NEGATIVE
NRBC BLD-RTO: 0 /100 WBC
PH UR STRIP: 7 [PH] (ref 5–8)
PLATELET # BLD AUTO: 256 K/UL (ref 150–450)
PMV BLD AUTO: 9.4 FL (ref 9.2–12.9)
POTASSIUM SERPL-SCNC: 4.1 MMOL/L (ref 3.5–5.1)
PROT SERPL-MCNC: 7.8 G/DL (ref 6–8.4)
PROT UR QL STRIP: ABNORMAL
RBC # BLD AUTO: 4.93 M/UL (ref 4.6–6.2)
RBC #/AREA URNS HPF: 15 /HPF (ref 0–4)
SODIUM SERPL-SCNC: 141 MMOL/L (ref 136–145)
SP GR UR STRIP: >1.03 (ref 1–1.03)
URN SPEC COLLECT METH UR: ABNORMAL
UROBILINOGEN UR STRIP-ACNC: NEGATIVE EU/DL
WBC # BLD AUTO: 5.1 K/UL (ref 3.9–12.7)
WBC #/AREA URNS HPF: 0 /HPF (ref 0–5)

## 2023-07-15 PROCEDURE — 96374 THER/PROPH/DIAG INJ IV PUSH: CPT

## 2023-07-15 PROCEDURE — 25000003 PHARM REV CODE 250: Performed by: PHYSICIAN ASSISTANT

## 2023-07-15 PROCEDURE — 96361 HYDRATE IV INFUSION ADD-ON: CPT

## 2023-07-15 PROCEDURE — 81000 URINALYSIS NONAUTO W/SCOPE: CPT | Performed by: PHYSICIAN ASSISTANT

## 2023-07-15 PROCEDURE — 83690 ASSAY OF LIPASE: CPT | Performed by: PHYSICIAN ASSISTANT

## 2023-07-15 PROCEDURE — 80053 COMPREHEN METABOLIC PANEL: CPT | Performed by: PHYSICIAN ASSISTANT

## 2023-07-15 PROCEDURE — 96375 TX/PRO/DX INJ NEW DRUG ADDON: CPT

## 2023-07-15 PROCEDURE — 63600175 PHARM REV CODE 636 W HCPCS: Performed by: PHYSICIAN ASSISTANT

## 2023-07-15 PROCEDURE — 87086 URINE CULTURE/COLONY COUNT: CPT | Performed by: PHYSICIAN ASSISTANT

## 2023-07-15 PROCEDURE — 99285 EMERGENCY DEPT VISIT HI MDM: CPT | Mod: 25

## 2023-07-15 PROCEDURE — 85025 COMPLETE CBC W/AUTO DIFF WBC: CPT | Performed by: PHYSICIAN ASSISTANT

## 2023-07-15 PROCEDURE — 25500020 PHARM REV CODE 255: Performed by: STUDENT IN AN ORGANIZED HEALTH CARE EDUCATION/TRAINING PROGRAM

## 2023-07-15 RX ORDER — TAMSULOSIN HYDROCHLORIDE 0.4 MG/1
0.4 CAPSULE ORAL
Status: COMPLETED | OUTPATIENT
Start: 2023-07-15 | End: 2023-07-15

## 2023-07-15 RX ORDER — TAMSULOSIN HYDROCHLORIDE 0.4 MG/1
0.4 CAPSULE ORAL DAILY
Qty: 10 CAPSULE | Refills: 0 | Status: SHIPPED | OUTPATIENT
Start: 2023-07-15 | End: 2023-07-15 | Stop reason: SDUPTHER

## 2023-07-15 RX ORDER — TAMSULOSIN HYDROCHLORIDE 0.4 MG/1
0.4 CAPSULE ORAL DAILY
Qty: 30 CAPSULE | Refills: 0 | Status: SHIPPED | OUTPATIENT
Start: 2023-07-15 | End: 2024-07-14

## 2023-07-15 RX ORDER — DROPERIDOL 2.5 MG/ML
2.5 INJECTION, SOLUTION INTRAMUSCULAR; INTRAVENOUS ONCE
Status: COMPLETED | OUTPATIENT
Start: 2023-07-15 | End: 2023-07-15

## 2023-07-15 RX ORDER — OXYCODONE AND ACETAMINOPHEN 5; 325 MG/1; MG/1
1 TABLET ORAL EVERY 4 HOURS PRN
Qty: 12 TABLET | Refills: 0 | Status: SHIPPED | OUTPATIENT
Start: 2023-07-15 | End: 2023-07-15 | Stop reason: SDUPTHER

## 2023-07-15 RX ORDER — KETOROLAC TROMETHAMINE 30 MG/ML
15 INJECTION, SOLUTION INTRAMUSCULAR; INTRAVENOUS
Status: COMPLETED | OUTPATIENT
Start: 2023-07-15 | End: 2023-07-15

## 2023-07-15 RX ORDER — MORPHINE SULFATE 4 MG/ML
3 INJECTION, SOLUTION INTRAMUSCULAR; INTRAVENOUS
Status: COMPLETED | OUTPATIENT
Start: 2023-07-15 | End: 2023-07-15

## 2023-07-15 RX ORDER — ONDANSETRON 8 MG/1
8 TABLET, ORALLY DISINTEGRATING ORAL EVERY 6 HOURS PRN
Qty: 30 TABLET | Refills: 0 | Status: SHIPPED | OUTPATIENT
Start: 2023-07-15

## 2023-07-15 RX ORDER — OXYCODONE AND ACETAMINOPHEN 5; 325 MG/1; MG/1
1 TABLET ORAL EVERY 4 HOURS PRN
Qty: 12 TABLET | Refills: 0 | Status: SHIPPED | OUTPATIENT
Start: 2023-07-15

## 2023-07-15 RX ORDER — MELOXICAM 15 MG/1
15 TABLET ORAL DAILY
Qty: 30 TABLET | Refills: 0 | Status: SHIPPED | OUTPATIENT
Start: 2023-07-15

## 2023-07-15 RX ORDER — FAMOTIDINE 20 MG/1
20 TABLET, FILM COATED ORAL 2 TIMES DAILY
Qty: 60 TABLET | Refills: 0 | Status: SHIPPED | OUTPATIENT
Start: 2023-07-15 | End: 2024-07-14

## 2023-07-15 RX ADMIN — TAMSULOSIN HYDROCHLORIDE 0.4 MG: 0.4 CAPSULE ORAL at 05:07

## 2023-07-15 RX ADMIN — SODIUM CHLORIDE, POTASSIUM CHLORIDE, SODIUM LACTATE AND CALCIUM CHLORIDE 1000 ML: 600; 310; 30; 20 INJECTION, SOLUTION INTRAVENOUS at 05:07

## 2023-07-15 RX ADMIN — DROPERIDOL 2.5 MG: 2.5 INJECTION, SOLUTION INTRAMUSCULAR; INTRAVENOUS at 04:07

## 2023-07-15 RX ADMIN — MORPHINE SULFATE 3 MG: 4 INJECTION INTRAVENOUS at 05:07

## 2023-07-15 RX ADMIN — KETOROLAC TROMETHAMINE 15 MG: 30 INJECTION, SOLUTION INTRAMUSCULAR; INTRAVENOUS at 05:07

## 2023-07-15 RX ADMIN — IOHEXOL 70 ML: 350 INJECTION, SOLUTION INTRAVENOUS at 05:07

## 2023-07-15 NOTE — DISCHARGE INSTRUCTIONS

## 2023-07-15 NOTE — ED TRIAGE NOTES
Pt presents to ER with c/o right side flank pain that started about an hour ago. Pt states he woke up out of his sleep to have a BM and after that the pain started. Pt rates pain 10/10 at this time. Pt has c/o nausea but has not thrown up. Pt has no other concerns at this time. Pt AAOx4.

## 2023-07-15 NOTE — ED PROVIDER NOTES
"Encounter Date: 7/15/2023       History     Chief Complaint   Patient presents with    Flank Pain     Pt presents to the ED with c/o right flank pain that feels like "tightness" x3 hours. Denies any difficulty urinating or taking meds pta.      24yo M smoker presents to ED with acute onset R flank pain, lower abdominal pain, nausea without emesis which began just pta.    Pt states he was smoking a black and mild and drinking an [place] tea when symptoms began. Similar symptoms last week, evaluated at outside facility, grossly unremarkable w/u, presentation thought to be related to cannabinoid hyperemesis. Pt states symptoms had resolved. Denies hx pancreatitis, denies excessive ETOH use. Denies hx gallbladder issues. Pain constant, sharp, severe. No alleviating or exacerbating factors. No urinary complaints. No hx nephrolithiasis.    States last smoked marijuana yesterday.     No hx abdominal surgeries    PMH:  Hx anaphylaxis--unknown culprit    Review of patient's allergies indicates:  No Known Allergies  Past Medical History:   Diagnosis Date    Hives     large whelps several times in the past    Laryngeal edema allergic agent unknown    history of allergic reactions     Past Surgical History:   Procedure Laterality Date    TYMPANOPLASTY      right ear     Family History   Problem Relation Age of Onset    Cancer Maternal Grandmother         stomach cancer    Stroke Maternal Grandfather     Hypertension Maternal Grandfather     Hypertension Paternal Grandfather      Social History     Tobacco Use    Smoking status: Never     Passive exposure: Never    Smokeless tobacco: Never   Substance Use Topics    Alcohol use: No    Drug use: No     Review of Systems   Constitutional:  Negative for fever.   Respiratory:  Negative for shortness of breath.    Cardiovascular:  Negative for chest pain.   Gastrointestinal:  Positive for abdominal pain and nausea. Negative for vomiting.   Genitourinary:  Positive for flank pain. " Negative for dysuria.   Musculoskeletal:  Negative for back pain.   Neurological:  Negative for syncope.     Physical Exam     Initial Vitals [07/15/23 0350]   BP Pulse Resp Temp SpO2   128/60 66 20 98.4 °F (36.9 °C) 100 %      MAP       --         Physical Exam    Nursing note and vitals reviewed.  Constitutional: He appears well-developed and well-nourished. He is not diaphoretic.   Uncomfortable, nontoxic, sitting upright in bed.  Occasional belching and retching.  No active emesis.   HENT:   Head: Normocephalic and atraumatic.   Neck: Neck supple.   Normal range of motion.  Cardiovascular:  Intact distal pulses.           Pulmonary/Chest: No respiratory distress.   Abdominal: Abdomen is soft. Bowel sounds are normal. He exhibits no distension.   Mild suprapubic tenderness, no McBurney's point tenderness, moderate flank tenderness.   Musculoskeletal:         General: No tenderness. Normal range of motion.      Cervical back: Normal range of motion and neck supple.     Neurological: He is alert and oriented to person, place, and time.   Skin: Skin is warm. Capillary refill takes less than 2 seconds.   Psychiatric: Thought content normal.   Anxious, akathisia       ED Course   Procedures  Labs Reviewed   COMPREHENSIVE METABOLIC PANEL - Abnormal; Notable for the following components:       Result Value    Glucose 124 (*)     Total Bilirubin 1.2 (*)     All other components within normal limits   URINALYSIS, REFLEX TO URINE CULTURE - Abnormal; Notable for the following components:    Specific Gravity, UA >1.030 (*)     Protein, UA 1+ (*)     Occult Blood UA 2+ (*)     All other components within normal limits    Narrative:     Specimen Source->Urine   URINALYSIS MICROSCOPIC - Abnormal; Notable for the following components:    RBC, UA 15 (*)     All other components within normal limits    Narrative:     Specimen Source->Urine   CULTURE, URINE   CULTURE, URINE   CBC W/ AUTO DIFFERENTIAL   LIPASE          Imaging  Results              CT Abdomen Pelvis With Contrast (Final result)  Result time 07/15/23 06:26:21      Final result by Vijay Kumar MD (07/15/23 06:26:21)                   Impression:      1. On the right, mild-to-moderate hydronephrosis and hydroureter, felt likely related to a 3-4 mm calculus within the distal ureter.  2. Additional nonobstructing bilateral renal calculi measuring up to 3 mm.  3. Additional details, as provided in the body of the report.      Electronically signed by: Viajy Kumar  Date:    07/15/2023  Time:    06:26               Narrative:    EXAMINATION:  CT ABDOMEN PELVIS WITH CONTRAST    CLINICAL HISTORY:  Flank pain, kidney stone suspected;RLQ ttp;    TECHNIQUE:  Low dose axial images, sagittal and coronal reformations were obtained from the lung bases to the pubic symphysis following the IV administration of 70 mL of Omnipaque 350    COMPARISON:  CT of the abdomen and pelvis performed 12/07/2020.    FINDINGS:  Lower chest: Unremarkable.    Liver: Unremarkable.    Gallbladder and bile ducts: Unremarkable. No biliary ductal dilatation.    Pancreas: Unremarkable.    Spleen: Unremarkable.    Adrenals: Unremarkable.    Kidneys:    Small nonobstructing intrarenal calculi are noted bilaterally, measuring up to 3 mm.  Additionally on the right, there is mild-to-moderate hydronephrosis and hydroureter, felt likely related to a 3-4 mm calculus within the distal ureter.  On the left, no evidence of obstructive uropathy.    Lymph nodes: No abdominal or pelvic lymphadenopathy.    Bowel and mesentery: No evidence of bowel obstruction.  Normal appendix.    Abdominal aorta: Unremarkable.    Inferior vena cava: Unremarkable.    Free fluid or free air: None.    Pelvis: Unremarkable.    Body wall: Unremarkable.    Bones: Unremarkable.                                       Medications   droPERidol injection 2.5 mg (2.5 mg Intravenous Given 7/15/23 0577)   iohexoL (OMNIPAQUE 350) injection 70 mL (70  mLs Intravenous Given 7/15/23 0538)   ketorolac injection 15 mg (15 mg Intravenous Given 7/15/23 0550)   tamsulosin 24 hr capsule 0.4 mg (0.4 mg Oral Given 7/15/23 0551)   morphine injection 3 mg (3 mg Intravenous Given 7/15/23 0551)   lactated ringers bolus 1,000 mL (0 mLs Intravenous Stopped 7/15/23 0645)     Medical Decision Making:   Differential Diagnosis:   Cannabinoid hyperemesis, nephrolithiasis, appendicitis  Clinical Tests:   Lab Tests: Ordered  Radiological Study: Ordered               This is Dr. Guo dictating:  I have assumed care of this patient from midlevel Bowman  at change of shift.  I have reviewed their chart and note, all available labs and imaging, and have independently evaluated the patient myself    MEDICAL DECISION MAKING    After review of the patient's physical exam, ED testing, and history/symptoms, relevant labs, imaging, available outside records  a wide differential was considered including but not limited to: infectious, traumatic, vascular, toxicological , metabolic, malignant, ischemic, embolic, psychological, genetic, iatrogenic, idiopathic, medication reaction, substance dependence/intoxication/withdrawal, electrolyte or blood dyscrasia, and other etiologies.        labs/imaging/interventions include:       Medications   droPERidol injection 2.5 mg (2.5 mg Intravenous Given 7/15/23 0447)   iohexoL (OMNIPAQUE 350) injection 70 mL (70 mLs Intravenous Given 7/15/23 0538)   ketorolac injection 15 mg (15 mg Intravenous Given 7/15/23 0550)   tamsulosin 24 hr capsule 0.4 mg (0.4 mg Oral Given 7/15/23 0551)   morphine injection 3 mg (3 mg Intravenous Given 7/15/23 0551)   lactated ringers bolus 1,000 mL (0 mLs Intravenous Stopped 7/15/23 0645)     Labs Reviewed   COMPREHENSIVE METABOLIC PANEL - Abnormal; Notable for the following components:       Result Value    Glucose 124 (*)     Total Bilirubin 1.2 (*)     All other components within normal limits   URINALYSIS, REFLEX TO URINE  CULTURE - Abnormal; Notable for the following components:    Specific Gravity, UA >1.030 (*)     Protein, UA 1+ (*)     Occult Blood UA 2+ (*)     All other components within normal limits    Narrative:     Specimen Source->Urine   URINALYSIS MICROSCOPIC - Abnormal; Notable for the following components:    RBC, UA 15 (*)     All other components within normal limits    Narrative:     Specimen Source->Urine   CULTURE, URINE   CULTURE, URINE   CBC W/ AUTO DIFFERENTIAL   LIPASE      CT Abdomen Pelvis With Contrast   Final Result      1. On the right, mild-to-moderate hydronephrosis and hydroureter, felt likely related to a 3-4 mm calculus within the distal ureter.   2. Additional nonobstructing bilateral renal calculi measuring up to 3 mm.   3. Additional details, as provided in the body of the report.         Electronically signed by: Vijay Kumar   Date:    07/15/2023   Time:    06:26          Will discharge on zofran, percocet, flomax, provide urine strainer and refer to urology  I have independently evaluated and interpreted all available labs and imaging to the extent of the scope of my practice.  The suspected diagnosis, treatment and plan were discussed with the patient. All questions or concerns have been addressed.    Note was created using voice recognition software. Note may have occasional typographical or grammatical errors , garbled syntax, and other bizarre constructions that may not have been identified and edited despite good esther initial review prior to signing.            Clinical Impression:   Final diagnoses:  [N20.0] Kidney stone (Primary)        ED Disposition Condition    Discharge Stable          ED Prescriptions       Medication Sig Dispense Start Date End Date Auth. Provider    oxyCODONE-acetaminophen (PERCOCET) 5-325 mg per tablet  (Status: Discontinued) Take 1 tablet by mouth every 4 (four) hours as needed for Pain. 12 tablet 7/15/2023 7/15/2023 Juanito Guo MD    tamsulosin  (FLOMAX) 0.4 mg Cap  (Status: Discontinued) Take 1 capsule (0.4 mg total) by mouth once daily. 10 capsule 7/15/2023 7/15/2023 Juanito Guo MD    meloxicam (MOBIC) 15 MG tablet Take 1 tablet (15 mg total) by mouth once daily. 30 tablet 7/15/2023 -- Juanito Guo MD    famotidine (PEPCID) 20 MG tablet Take 1 tablet (20 mg total) by mouth 2 (two) times daily. 60 tablet 7/15/2023 7/14/2024 Juanito Guo MD    ondansetron (ZOFRAN-ODT) 8 MG TbDL Take 1 tablet (8 mg total) by mouth every 6 (six) hours as needed. 30 tablet 7/15/2023 -- Juanito Guo MD    tamsulosin (FLOMAX) 0.4 mg Cap Take 1 capsule (0.4 mg total) by mouth once daily. 30 capsule 7/15/2023 7/14/2024 Juanito Guo MD    oxyCODONE-acetaminophen (PERCOCET) 5-325 mg per tablet Take 1 tablet by mouth every 4 (four) hours as needed for Pain. 12 tablet 7/15/2023 -- Juanito Guo MD          Follow-up Information    None          Demetris Lara PA-C  07/15/23 0340

## 2023-07-17 LAB — BACTERIA UR CULT: NO GROWTH

## 2023-12-15 NOTE — DISCHARGE INSTRUCTIONS
Lab Results   Component Value Date    HGBA1C 8.8 (H) 11/11/2023      Latest Reference Range & Units 12/14/23 08:25   Glucose, Random 65 - 140 mg/dL 242 (H)   (H): Data is abnormally high    Blood Sugar Average: Last 72 hrs:  (P) 312.6Outpatient regimen glargine 18 units at bedtime with lispro 6 units 3 times daily  While inpatient and with poor oral intake will start with Lantus 10 units at bedtime with insulin sliding scale  Accu-Cheks ordered.  Hypoglycemia protocol     You were seen in the emergency department for an ALLERGIC reaction.  We gave you some steroids and Benadryl and you felt better.  We watched you for a few hours to ensure your symptoms did not return or worsen.  We think you're safe to go home.  Please follow-up with your primary care provider or allergist.  We've given you a prescription for a steroid.  Please take it for the next few days.  Please return immediately for any new or worsening difficulty breathing, nausea, vomiting, abdominal pain, hives, itching, rash, or you become concerned in any other way.

## 2024-09-30 ENCOUNTER — HOSPITAL ENCOUNTER (EMERGENCY)
Facility: HOSPITAL | Age: 24
Discharge: HOME | End: 2024-09-30
Attending: EMERGENCY MEDICINE
Payer: MEDICAID

## 2024-09-30 VITALS
TEMPERATURE: 98 F | SYSTOLIC BLOOD PRESSURE: 110 MMHG | HEIGHT: 71 IN | RESPIRATION RATE: 18 BRPM | OXYGEN SATURATION: 100 % | WEIGHT: 135 LBS | BODY MASS INDEX: 18.9 KG/M2 | HEART RATE: 63 BPM | DIASTOLIC BLOOD PRESSURE: 72 MMHG

## 2024-09-30 DIAGNOSIS — N30.00 ACUTE CYSTITIS WITHOUT HEMATURIA: Primary | ICD-10-CM

## 2024-09-30 LAB
ALBUMIN SERPL BCP-MCNC: 4.6 G/DL (ref 3.5–5.2)
ALP SERPL-CCNC: 77 U/L (ref 55–135)
ALT SERPL W/O P-5'-P-CCNC: 40 U/L (ref 10–44)
ANION GAP SERPL CALC-SCNC: 9 MMOL/L (ref 8–16)
AST SERPL-CCNC: 32 U/L (ref 10–40)
BACTERIA #/AREA URNS HPF: ABNORMAL /HPF
BASOPHILS # BLD AUTO: 0.02 K/UL (ref 0–0.2)
BASOPHILS NFR BLD: 0.5 % (ref 0–1.9)
BILIRUB SERPL-MCNC: 0.7 MG/DL (ref 0.1–1)
BILIRUB UR QL STRIP: NEGATIVE
BUN SERPL-MCNC: 8 MG/DL (ref 6–20)
CALCIUM SERPL-MCNC: 9.8 MG/DL (ref 8.7–10.5)
CHLORIDE SERPL-SCNC: 104 MMOL/L (ref 95–110)
CLARITY UR: CLEAR
CO2 SERPL-SCNC: 26 MMOL/L (ref 23–29)
COLOR UR: YELLOW
CREAT SERPL-MCNC: 0.9 MG/DL (ref 0.5–1.4)
DIFFERENTIAL METHOD BLD: ABNORMAL
EOSINOPHIL # BLD AUTO: 0.2 K/UL (ref 0–0.5)
EOSINOPHIL NFR BLD: 3.6 % (ref 0–8)
ERYTHROCYTE [DISTWIDTH] IN BLOOD BY AUTOMATED COUNT: 13.5 % (ref 11.5–14.5)
EST. GFR  (NO RACE VARIABLE): >60 ML/MIN/1.73 M^2
GLUCOSE SERPL-MCNC: 91 MG/DL (ref 70–110)
GLUCOSE UR QL STRIP: NEGATIVE
HCT VFR BLD AUTO: 46.6 % (ref 40–54)
HGB BLD-MCNC: 15.3 G/DL (ref 14–18)
HGB UR QL STRIP: NEGATIVE
IMM GRANULOCYTES # BLD AUTO: 0 K/UL (ref 0–0.04)
IMM GRANULOCYTES NFR BLD AUTO: 0 % (ref 0–0.5)
KETONES UR QL STRIP: NEGATIVE
LEUKOCYTE ESTERASE UR QL STRIP: ABNORMAL
LYMPHOCYTES # BLD AUTO: 1.7 K/UL (ref 1–4.8)
LYMPHOCYTES NFR BLD: 41.3 % (ref 18–48)
MCH RBC QN AUTO: 30.2 PG (ref 27–31)
MCHC RBC AUTO-ENTMCNC: 32.8 G/DL (ref 32–36)
MCV RBC AUTO: 92 FL (ref 82–98)
MICROSCOPIC COMMENT: ABNORMAL
MONOCYTES # BLD AUTO: 0.4 K/UL (ref 0.3–1)
MONOCYTES NFR BLD: 9.5 % (ref 4–15)
NEUTROPHILS # BLD AUTO: 1.9 K/UL (ref 1.8–7.7)
NEUTROPHILS NFR BLD: 45.1 % (ref 38–73)
NITRITE UR QL STRIP: NEGATIVE
NRBC BLD-RTO: 0 /100 WBC
PH UR STRIP: 6 [PH] (ref 5–8)
PLATELET # BLD AUTO: 267 K/UL (ref 150–450)
PMV BLD AUTO: 9.1 FL (ref 9.2–12.9)
POTASSIUM SERPL-SCNC: 4.5 MMOL/L (ref 3.5–5.1)
PROT SERPL-MCNC: 7.9 G/DL (ref 6–8.4)
PROT UR QL STRIP: NEGATIVE
RBC # BLD AUTO: 5.06 M/UL (ref 4.6–6.2)
RBC #/AREA URNS HPF: 2 /HPF (ref 0–4)
SODIUM SERPL-SCNC: 139 MMOL/L (ref 136–145)
SP GR UR STRIP: 1.02 (ref 1–1.03)
URN SPEC COLLECT METH UR: ABNORMAL
UROBILINOGEN UR STRIP-ACNC: NEGATIVE EU/DL
WBC # BLD AUTO: 4.21 K/UL (ref 3.9–12.7)
WBC #/AREA URNS HPF: 12 /HPF (ref 0–5)

## 2024-09-30 PROCEDURE — 87491 CHLMYD TRACH DNA AMP PROBE: CPT | Performed by: EMERGENCY MEDICINE

## 2024-09-30 PROCEDURE — 96372 THER/PROPH/DIAG INJ SC/IM: CPT | Performed by: PHYSICIAN ASSISTANT

## 2024-09-30 PROCEDURE — 63600175 PHARM REV CODE 636 W HCPCS: Performed by: PHYSICIAN ASSISTANT

## 2024-09-30 PROCEDURE — 25500020 PHARM REV CODE 255: Performed by: EMERGENCY MEDICINE

## 2024-09-30 PROCEDURE — 85025 COMPLETE CBC W/AUTO DIFF WBC: CPT | Performed by: PHYSICIAN ASSISTANT

## 2024-09-30 PROCEDURE — 87591 N.GONORRHOEAE DNA AMP PROB: CPT | Performed by: EMERGENCY MEDICINE

## 2024-09-30 PROCEDURE — 87086 URINE CULTURE/COLONY COUNT: CPT | Performed by: EMERGENCY MEDICINE

## 2024-09-30 PROCEDURE — 99285 EMERGENCY DEPT VISIT HI MDM: CPT | Mod: 25

## 2024-09-30 PROCEDURE — 96374 THER/PROPH/DIAG INJ IV PUSH: CPT

## 2024-09-30 PROCEDURE — 25000003 PHARM REV CODE 250: Performed by: PHYSICIAN ASSISTANT

## 2024-09-30 PROCEDURE — 80053 COMPREHEN METABOLIC PANEL: CPT | Performed by: PHYSICIAN ASSISTANT

## 2024-09-30 PROCEDURE — 81000 URINALYSIS NONAUTO W/SCOPE: CPT | Performed by: EMERGENCY MEDICINE

## 2024-09-30 RX ORDER — DOXYCYCLINE 100 MG/1
100 CAPSULE ORAL 2 TIMES DAILY
Qty: 14 CAPSULE | Refills: 0 | Status: SHIPPED | OUTPATIENT
Start: 2024-09-30 | End: 2024-10-07

## 2024-09-30 RX ORDER — CIPROFLOXACIN 500 MG/1
500 TABLET ORAL 2 TIMES DAILY
Qty: 20 TABLET | Refills: 0 | Status: SHIPPED | OUTPATIENT
Start: 2024-09-30 | End: 2024-10-10

## 2024-09-30 RX ORDER — CIPROFLOXACIN 500 MG/1
500 TABLET ORAL
Status: COMPLETED | OUTPATIENT
Start: 2024-09-30 | End: 2024-09-30

## 2024-09-30 RX ORDER — KETOROLAC TROMETHAMINE 30 MG/ML
15 INJECTION, SOLUTION INTRAMUSCULAR; INTRAVENOUS
Status: COMPLETED | OUTPATIENT
Start: 2024-09-30 | End: 2024-09-30

## 2024-09-30 RX ORDER — DOXYCYCLINE HYCLATE 100 MG
100 TABLET ORAL
Status: COMPLETED | OUTPATIENT
Start: 2024-09-30 | End: 2024-09-30

## 2024-09-30 RX ORDER — NAPROXEN 500 MG/1
500 TABLET ORAL 2 TIMES DAILY WITH MEALS
Qty: 20 TABLET | Refills: 0 | Status: SHIPPED | OUTPATIENT
Start: 2024-09-30

## 2024-09-30 RX ORDER — CEFTRIAXONE 500 MG/1
500 INJECTION, POWDER, FOR SOLUTION INTRAMUSCULAR; INTRAVENOUS
Status: COMPLETED | OUTPATIENT
Start: 2024-09-30 | End: 2024-09-30

## 2024-09-30 RX ADMIN — CIPROFLOXACIN 500 MG: 500 TABLET ORAL at 12:09

## 2024-09-30 RX ADMIN — CEFTRIAXONE SODIUM 500 MG: 500 INJECTION, POWDER, FOR SOLUTION INTRAMUSCULAR; INTRAVENOUS at 12:09

## 2024-09-30 RX ADMIN — KETOROLAC TROMETHAMINE 15 MG: 30 INJECTION, SOLUTION INTRAMUSCULAR at 11:09

## 2024-09-30 RX ADMIN — DOXYCYCLINE HYCLATE 100 MG: 100 TABLET, COATED ORAL at 12:09

## 2024-09-30 RX ADMIN — IOHEXOL 75 ML: 350 INJECTION, SOLUTION INTRAVENOUS at 12:09

## 2024-09-30 NOTE — DISCHARGE INSTRUCTIONS
Finish full course of antibiotics prescribed to you.  You may take naproxen for pain control.  Follow up with the primary doctor in the next 2-3 days.    You will receive a call with your gonorrhea and chlamydia test results.  No sexual activity until you receive test results.

## 2024-09-30 NOTE — ED PROVIDER NOTES
Encounter Date: 9/30/2024       History     Chief Complaint   Patient presents with    Abdominal Pain    Flank Pain     Pt to ED from home with c/o right sided flank pain which radiates to right side of abdomen accompanied by increased urination x 1 week. Pt denies cp, sob, n/v/d.     24 year old male presents to the ER for evaluation of right-sided abdominal and flank pain.  Patient reports that symptoms have been ongoing for the last several days but worsened in the last 2 days.  He has noticed some associated urgency and frequency.  No dysuria hematuria.  He denies any previous abdominal surgeries.  No penile discharge or testicular pain or swelling.  No concerns for STDs.  Denies any fever chills at home.  He has had a remote history of kidney stone in the past.    The history is provided by the patient.     Review of patient's allergies indicates:  No Known Allergies  Past Medical History:   Diagnosis Date    Hives     large whelps several times in the past    Laryngeal edema allergic agent unknown    history of allergic reactions     Past Surgical History:   Procedure Laterality Date    TYMPANOPLASTY      right ear     Family History   Problem Relation Name Age of Onset    Cancer Maternal Grandmother          stomach cancer    Stroke Maternal Grandfather      Hypertension Maternal Grandfather      Hypertension Paternal Grandfather       Social History     Tobacco Use    Smoking status: Never     Passive exposure: Never    Smokeless tobacco: Never   Substance Use Topics    Alcohol use: No    Drug use: No     Review of Systems   Constitutional:  Negative for chills and fever.   Eyes:  Negative for visual disturbance.   Respiratory:  Negative for shortness of breath.    Cardiovascular:  Negative for chest pain.   Gastrointestinal:  Positive for abdominal pain. Negative for nausea and vomiting.   Genitourinary:  Positive for flank pain and urgency. Negative for dysuria, hematuria, penile discharge, penile pain,  penile swelling, scrotal swelling and testicular pain.   Musculoskeletal:  Negative for myalgias.   Skin:  Negative for rash.   Allergic/Immunologic: Negative for immunocompromised state.   Neurological:  Negative for weakness and numbness.   Hematological:  Does not bruise/bleed easily.   Psychiatric/Behavioral:  Negative for confusion.        Physical Exam     Initial Vitals [09/30/24 0948]   BP Pulse Resp Temp SpO2   121/70 76 15 98.2 °F (36.8 °C) 100 %      MAP       --         Physical Exam    Vitals reviewed.  Constitutional: He appears well-developed and well-nourished. He is not diaphoretic. No distress.   HENT:   Head: Normocephalic and atraumatic.   Eyes: Conjunctivae and EOM are normal.   Neck: Neck supple.   Cardiovascular:  Normal rate, regular rhythm, normal heart sounds and intact distal pulses.           Pulmonary/Chest: No respiratory distress.   Abdominal: There is abdominal tenderness (Right flank) in the right lower quadrant.   There is right CVA tenderness.  Musculoskeletal:         General: Normal range of motion.      Cervical back: Neck supple.     Neurological: He is alert and oriented to person, place, and time.   Skin: Skin is warm.         ED Course   Procedures  Labs Reviewed   URINALYSIS, REFLEX TO URINE CULTURE - Abnormal       Result Value    Specimen UA Urine, Clean Catch      Color, UA Yellow      Appearance, UA Clear      pH, UA 6.0      Specific Gravity, UA 1.020      Protein, UA Negative      Glucose, UA Negative      Ketones, UA Negative      Bilirubin (UA) Negative      Occult Blood UA Negative      Nitrite, UA Negative      Urobilinogen, UA Negative      Leukocytes, UA 1+ (*)     Narrative:     Specimen Source->Urine   CBC W/ AUTO DIFFERENTIAL - Abnormal    WBC 4.21      RBC 5.06      Hemoglobin 15.3      Hematocrit 46.6      MCV 92      MCH 30.2      MCHC 32.8      RDW 13.5      Platelets 267      MPV 9.1 (*)     Immature Granulocytes 0.0      Gran # (ANC) 1.9      Immature  Grans (Abs) 0.00      Lymph # 1.7      Mono # 0.4      Eos # 0.2      Baso # 0.02      nRBC 0      Gran % 45.1      Lymph % 41.3      Mono % 9.5      Eosinophil % 3.6      Basophil % 0.5      Differential Method Automated     URINALYSIS MICROSCOPIC - Abnormal    RBC, UA 2      WBC, UA 12 (*)     Bacteria Occasional      Microscopic Comment SEE COMMENT      Narrative:     Specimen Source->Urine   CULTURE, URINE   COMPREHENSIVE METABOLIC PANEL    Sodium 139      Potassium 4.5      Chloride 104      CO2 26      Glucose 91      BUN 8      Creatinine 0.9      Calcium 9.8      Total Protein 7.9      Albumin 4.6      Total Bilirubin 0.7      Alkaline Phosphatase 77      AST 32      ALT 40      eGFR >60      Anion Gap 9     C. TRACHOMATIS/N. GONORRHOEAE BY AMP DNA   C. TRACHOMATIS/N. GONORRHOEAE BY AMP DNA          Imaging Results              CT Abdomen Pelvis With IV Contrast NO Oral Contrast (Final result)  Result time 09/30/24 12:14:49      Final result by Michele Singh III, MD (09/30/24 12:14:49)                   Impression:      No abnormality seen in the right lower quadrant.    No hydronephrosis.    Small nonobstructing right renal calculus.      Electronically signed by: Michele Singh MD  Date:    09/30/2024  Time:    12:14               Narrative:    EXAMINATION:  CT ABDOMEN PELVIS WITH IV CONTRAST    CLINICAL HISTORY:  RLQ abdominal pain (Age >= 14y);    FINDINGS:  Comparison is 07/15/2023.    Patient was administered 75 cc of Omnipaque 350 intravenously.    Lung bases are clear.  Liver, gallbladder, and biliary tree show nothing unusual.  There is a tiny right lobe liver cyst.  The spleen, stomach, pancreas, and duodenum show nothing unusual.  The adrenal glands are not enlarged.  There is a 2 mm calculus lower pole right kidney.  Left kidney is unremarkable.  The previously seen right-sided hydroureter and hydronephrosis has resolved.  There is no hydronephrosis on today's study.  Vessels enhance  normally.  No para-aortic, retroperitoneal, or mesenteric adenopathy is seen.  Pelvic organs demonstrate nothing unusual.  The appendix, and terminal ileum show nothing unusual.  No abscess collection seen.  No obstruction, ileus, or perforation seen.  No abscess or ascites is seen.  The bones are unremarkable.                                       Medications   cefTRIAXone injection 500 mg (has no administration in time range)   doxycycline tablet 100 mg (has no administration in time range)   ciprofloxacin HCl tablet 500 mg (has no administration in time range)   ketorolac injection 15 mg (15 mg Intravenous Given 9/30/24 1156)   iohexoL (OMNIPAQUE 350) injection 75 mL (75 mLs Intravenous Given 9/30/24 1201)     Medical Decision Making  Differential diagnosis:    Nephrolithiasis, pyelonephritis, UTI, STD, appendicitis, electrolyte derangement, dehydration    Amount and/or Complexity of Data Reviewed  Labs: ordered. Decision-making details documented in ED Course.  Radiology: ordered. Decision-making details documented in ED Course.    Risk  Prescription drug management.         APC / Resident Notes:   Patient seen in the ER promptly upon arrival.  He is afebrile, no acute distress.  Physical examination reveals tenderness on palpation of the right lower quadrant/right flank.  Minimal right CVA tenderness on exam.  Abdomen is otherwise soft, nondistended.  IV access established, labs ordered.  Patient given Toradol for pain control.   Dictation #1  MRN:[id number]  CSN:[id number]      ED Course as of 09/30/24 1238   Mon Sep 30, 2024   1205 Normal white count.  4.2.  Hemoglobin stable [AJ]   1205 Comprehensive metabolic panel  Chemistries unremarkable [AJ]   1205 Bacteria, UA: Occasional [AJ]   1205 WBC, UA(!): 12 [AJ]   1205 Given flank pain, possible developing pyelonephritis [AJ]   1236 CT Abdomen Pelvis With IV Contrast NO Oral Contrast  Impression:     No abnormality seen in the right lower quadrant.     No  hydronephrosis.     Small nonobstructing right renal calculus.      [AJ]   1236 GC obtained, pending results  Patient is sexually active, occasional condom use.    Will cover with doxycycline and Rocephin for possible STD.  Will also cover with ciprofloxacin for concerns of developing pyelonephritis.    Patient informed that he will receive a call if test is positive.  Advised to refrain from sexual activity until he receives his test results.    He is to finish full course of antibiotics.  Will prescribed home on naproxen for pain control.  Advised strict return precautions ED including but not limited to worsening pain, development of fever or any other concerning findings.  He is otherwise stable for discharge.     [AJ]      ED Course User Index  [AJ] Michela Oshea PA-C                           Clinical Impression:  Final diagnoses:  [N30.00] Acute cystitis without hematuria (Primary)          ED Disposition Condition    Discharge Stable          ED Prescriptions       Medication Sig Dispense Start Date End Date Auth. Provider    doxycycline (VIBRAMYCIN) 100 MG Cap Take 1 capsule (100 mg total) by mouth 2 (two) times daily. for 7 days 14 capsule 9/30/2024 10/7/2024 Michela Oshea PA-C    ciprofloxacin HCl (CIPRO) 500 MG tablet Take 1 tablet (500 mg total) by mouth 2 (two) times daily. for 10 days 20 tablet 9/30/2024 10/10/2024 Michela Oshea PA-C    naproxen (NAPROSYN) 500 MG tablet Take 1 tablet (500 mg total) by mouth 2 (two) times daily with meals. 20 tablet 9/30/2024 -- Michela Oshea PA-C          Follow-up Information       Follow up With Specialties Details Why Contact Info    [facility] -    [address]  [place] [address]  [phone]               Michela Oshea PA-C  09/30/24 3059

## 2024-10-02 ENCOUNTER — HOSPITAL ENCOUNTER (EMERGENCY)
Facility: HOSPITAL | Age: 24
Discharge: HOME | End: 2024-10-02
Attending: STUDENT IN AN ORGANIZED HEALTH CARE EDUCATION/TRAINING PROGRAM
Payer: MEDICAID

## 2024-10-02 VITALS
BODY MASS INDEX: 18.83 KG/M2 | RESPIRATION RATE: 20 BRPM | WEIGHT: 135 LBS | SYSTOLIC BLOOD PRESSURE: 112 MMHG | DIASTOLIC BLOOD PRESSURE: 75 MMHG | TEMPERATURE: 98 F | HEART RATE: 65 BPM | OXYGEN SATURATION: 98 %

## 2024-10-02 DIAGNOSIS — N20.0 NEPHROLITHIASIS: Primary | ICD-10-CM

## 2024-10-02 LAB
ALBUMIN SERPL BCP-MCNC: 4 G/DL (ref 3.5–5.2)
ALP SERPL-CCNC: 72 U/L (ref 55–135)
ALT SERPL W/O P-5'-P-CCNC: 35 U/L (ref 10–44)
ANION GAP SERPL CALC-SCNC: 10 MMOL/L (ref 8–16)
AST SERPL-CCNC: 39 U/L (ref 10–40)
BACTERIA UR CULT: NO GROWTH
BASOPHILS # BLD AUTO: 0.03 K/UL (ref 0–0.2)
BASOPHILS NFR BLD: 0.7 % (ref 0–1.9)
BILIRUB SERPL-MCNC: 0.6 MG/DL (ref 0.1–1)
BUN SERPL-MCNC: 11 MG/DL (ref 6–20)
CALCIUM SERPL-MCNC: 9.1 MG/DL (ref 8.7–10.5)
CHLORIDE SERPL-SCNC: 105 MMOL/L (ref 95–110)
CO2 SERPL-SCNC: 23 MMOL/L (ref 23–29)
CREAT SERPL-MCNC: 1 MG/DL (ref 0.5–1.4)
DIFFERENTIAL METHOD BLD: ABNORMAL
EOSINOPHIL # BLD AUTO: 0.2 K/UL (ref 0–0.5)
EOSINOPHIL NFR BLD: 4.9 % (ref 0–8)
ERYTHROCYTE [DISTWIDTH] IN BLOOD BY AUTOMATED COUNT: 13.2 % (ref 11.5–14.5)
EST. GFR  (NO RACE VARIABLE): >60 ML/MIN/1.73 M^2
GLUCOSE SERPL-MCNC: 108 MG/DL (ref 70–110)
HCT VFR BLD AUTO: 38.9 % (ref 40–54)
HGB BLD-MCNC: 13.2 G/DL (ref 14–18)
IMM GRANULOCYTES # BLD AUTO: 0.03 K/UL (ref 0–0.04)
IMM GRANULOCYTES NFR BLD AUTO: 0.7 % (ref 0–0.5)
LIPASE SERPL-CCNC: 22 U/L (ref 4–60)
LYMPHOCYTES # BLD AUTO: 2.1 K/UL (ref 1–4.8)
LYMPHOCYTES NFR BLD: 47.1 % (ref 18–48)
MCH RBC QN AUTO: 30.6 PG (ref 27–31)
MCHC RBC AUTO-ENTMCNC: 33.9 G/DL (ref 32–36)
MCV RBC AUTO: 90 FL (ref 82–98)
MONOCYTES # BLD AUTO: 0.4 K/UL (ref 0.3–1)
MONOCYTES NFR BLD: 7.7 % (ref 4–15)
NEUTROPHILS # BLD AUTO: 1.8 K/UL (ref 1.8–7.7)
NEUTROPHILS NFR BLD: 38.9 % (ref 38–73)
NRBC BLD-RTO: 0 /100 WBC
PLATELET # BLD AUTO: 236 K/UL (ref 150–450)
PMV BLD AUTO: 9.5 FL (ref 9.2–12.9)
POTASSIUM SERPL-SCNC: 3.8 MMOL/L (ref 3.5–5.1)
PROT SERPL-MCNC: 6.5 G/DL (ref 6–8.4)
RBC # BLD AUTO: 4.32 M/UL (ref 4.6–6.2)
SODIUM SERPL-SCNC: 138 MMOL/L (ref 136–145)
WBC # BLD AUTO: 4.52 K/UL (ref 3.9–12.7)

## 2024-10-02 PROCEDURE — 83690 ASSAY OF LIPASE: CPT | Performed by: STUDENT IN AN ORGANIZED HEALTH CARE EDUCATION/TRAINING PROGRAM

## 2024-10-02 PROCEDURE — 96374 THER/PROPH/DIAG INJ IV PUSH: CPT

## 2024-10-02 PROCEDURE — 25000003 PHARM REV CODE 250: Performed by: STUDENT IN AN ORGANIZED HEALTH CARE EDUCATION/TRAINING PROGRAM

## 2024-10-02 PROCEDURE — 96375 TX/PRO/DX INJ NEW DRUG ADDON: CPT

## 2024-10-02 PROCEDURE — 63600175 PHARM REV CODE 636 W HCPCS: Performed by: STUDENT IN AN ORGANIZED HEALTH CARE EDUCATION/TRAINING PROGRAM

## 2024-10-02 PROCEDURE — 99284 EMERGENCY DEPT VISIT MOD MDM: CPT | Mod: 25

## 2024-10-02 PROCEDURE — 85025 COMPLETE CBC W/AUTO DIFF WBC: CPT | Performed by: STUDENT IN AN ORGANIZED HEALTH CARE EDUCATION/TRAINING PROGRAM

## 2024-10-02 PROCEDURE — 80053 COMPREHEN METABOLIC PANEL: CPT | Performed by: STUDENT IN AN ORGANIZED HEALTH CARE EDUCATION/TRAINING PROGRAM

## 2024-10-02 RX ORDER — MORPHINE SULFATE 4 MG/ML
4 INJECTION, SOLUTION INTRAMUSCULAR; INTRAVENOUS
Status: COMPLETED | OUTPATIENT
Start: 2024-10-02 | End: 2024-10-02

## 2024-10-02 RX ORDER — HYDROCODONE BITARTRATE AND ACETAMINOPHEN 5; 325 MG/1; MG/1
1 TABLET ORAL EVERY 6 HOURS PRN
Qty: 12 TABLET | Refills: 0 | Status: SHIPPED | OUTPATIENT
Start: 2024-10-02 | End: 2024-10-05

## 2024-10-02 RX ORDER — ONDANSETRON HYDROCHLORIDE 2 MG/ML
4 INJECTION, SOLUTION INTRAVENOUS
Status: COMPLETED | OUTPATIENT
Start: 2024-10-02 | End: 2024-10-02

## 2024-10-02 RX ORDER — SODIUM CHLORIDE 9 MG/ML
1000 INJECTION, SOLUTION INTRAVENOUS
Status: COMPLETED | OUTPATIENT
Start: 2024-10-02 | End: 2024-10-02

## 2024-10-02 RX ADMIN — SODIUM CHLORIDE 1000 ML: 9 INJECTION, SOLUTION INTRAVENOUS at 05:10

## 2024-10-02 RX ADMIN — ONDANSETRON 4 MG: 2 INJECTION INTRAMUSCULAR; INTRAVENOUS at 05:10

## 2024-10-02 RX ADMIN — MORPHINE SULFATE 4 MG: 4 INJECTION, SOLUTION INTRAMUSCULAR; INTRAVENOUS at 05:10

## 2024-10-02 NOTE — Clinical Note
"Shade"Amparo Neal was seen and treated in our emergency department on 10/2/2024.  He may return to work on 10/04/2024.       If you have any questions or concerns, please don't hesitate to call.      Nani Perdomo RN    "

## 2024-10-02 NOTE — ED PROVIDER NOTES
Encounter Date: 10/2/2024       History     Chief Complaint   Patient presents with    Flank Pain     To rt side for a few days, here Monday for same thing, given meds but they are not working     24 y.o. male with history below presents for evaluation of flank pain.  Patient reports he was seen 2 days ago for the same complaint.  He was discharged with antibiotics and anti-inflammatory/pain medicine.  He tells me that this is not relieved his symptoms despite compliance with antibiotics and pain medicine.  Describes intermittent right flank pain over the last year, previously told that he has kidney stones.  Over the last 3 days, had worsening of right flank pain with urinary frequency and dysuria.  Flank pain radiates to the right upper and right lower quadrant abdomen.  Denies testicular pain or swelling.  Denies penile discharge.  No nausea or vomiting.  No constipation or diarrhea.    Triage vitals were reviewed and are: Initial Vitals [10/02/24 0457]  BP: 124/65  Pulse: 71  Resp: 16  Temp: 98 °F (36.7 °C)  SpO2: 100 %  MAP: n/a    The Patient:   has a past medical history of Hives and Laryngeal edema (allergic agent unknown).   has a past surgical history that includes Tympanoplasty.   reports that he has never smoked. He has never been exposed to tobacco smoke. He has never used smokeless tobacco. He reports that he does not drink alcohol and does not use drugs.  Has allergies listed as: Patient has no known allergies.        The history is provided by the patient. No  was used.     Review of patient's allergies indicates:  No Known Allergies  Past Medical History:   Diagnosis Date    Hives     large whelps several times in the past    Laryngeal edema allergic agent unknown    history of allergic reactions     Past Surgical History:   Procedure Laterality Date    TYMPANOPLASTY      right ear     Family History   Problem Relation Name Age of Onset    Cancer Maternal Grandmother           stomach cancer    Stroke Maternal Grandfather      Hypertension Maternal Grandfather      Hypertension Paternal Grandfather       Social History     Tobacco Use    Smoking status: Never     Passive exposure: Never    Smokeless tobacco: Never   Substance Use Topics    Alcohol use: No    Drug use: No     Review of Systems   Constitutional:  Negative for chills, fatigue and fever.   HENT:  Negative for congestion, hearing loss, sore throat and trouble swallowing.    Eyes:  Negative for visual disturbance.   Respiratory:  Negative for cough, chest tightness and shortness of breath.    Cardiovascular:  Negative for chest pain.   Gastrointestinal:  Positive for abdominal pain. Negative for constipation, diarrhea, nausea and vomiting.   Endocrine: Negative for cold intolerance and heat intolerance.   Genitourinary:  Positive for dysuria and frequency. Negative for difficulty urinating.   Musculoskeletal:  Negative for arthralgias and myalgias.   Skin:  Negative for rash.   Neurological:  Negative for dizziness and headaches.   Psychiatric/Behavioral:  Negative for suicidal ideas. The patient is not nervous/anxious.        Physical Exam     Initial Vitals [10/02/24 0457]   BP Pulse Resp Temp SpO2   124/65 71 16 98 °F (36.7 °C) 100 %      MAP       --         Physical Exam    Nursing note and vitals reviewed.  Constitutional: He appears well-developed and well-nourished.   Body mass index is 18.83 kg/m².    Appears uncomfortable   HENT:   Head: Normocephalic and atraumatic.   Eyes: EOM are normal. Pupils are equal, round, and reactive to light.   Neck: Neck supple.   Cardiovascular:  Normal rate, regular rhythm, normal heart sounds and intact distal pulses.           Pulmonary/Chest: Breath sounds normal. No respiratory distress.   Abdominal:   Patient placed supine with flexed knees.  Abdomen is soft, nondistended, moderately tender in the RUQ, RLQ, suprapubic region and without guarding. There are no overlying skin  changes. There are normal bowel sounds. Distal Pulses 2+. right CVA Tenderness.    Focused Testing //  (-) Marshall's sign   (-) Rebound Tenderness   (-) Heel Tap / Bed rock      Musculoskeletal:         General: Normal range of motion.      Cervical back: Neck supple.     Neurological: He is alert and oriented to person, place, and time. GCS score is 15. GCS eye subscore is 4. GCS verbal subscore is 5. GCS motor subscore is 6.   Skin: Skin is warm and dry. Capillary refill takes less than 2 seconds.   Psychiatric: He has a normal mood and affect. His behavior is normal.         ED Course   Procedures  Labs Reviewed   CBC W/ AUTO DIFFERENTIAL - Abnormal       Result Value    WBC 4.52      RBC 4.32 (*)     Hemoglobin 13.2 (*)     Hematocrit 38.9 (*)     MCV 90      MCH 30.6      MCHC 33.9      RDW 13.2      Platelets 236      MPV 9.5      Immature Granulocytes 0.7 (*)     Gran # (ANC) 1.8      Immature Grans (Abs) 0.03      Lymph # 2.1      Mono # 0.4      Eos # 0.2      Baso # 0.03      nRBC 0      Gran % 38.9      Lymph % 47.1      Mono % 7.7      Eosinophil % 4.9      Basophil % 0.7      Differential Method Automated     COMPREHENSIVE METABOLIC PANEL    Sodium 138      Potassium 3.8      Chloride 105      CO2 23      Glucose 108      BUN 11      Creatinine 1.0      Calcium 9.1      Total Protein 6.5      Albumin 4.0      Total Bilirubin 0.6      Alkaline Phosphatase 72      AST 39      ALT 35      eGFR >60      Anion Gap 10     LIPASE    Lipase 22            Imaging Results    None          Medications   0.9%  NaCl infusion (1,000 mLs Intravenous New Bag 10/2/24 0524)   morphine injection 4 mg (4 mg Intravenous Given 10/2/24 0525)   ondansetron injection 4 mg (4 mg Intravenous Given 10/2/24 0525)     Medical Decision Making  Encounter Date: 10/2/2024  --------------------------------------------------------------------------  24 y.o. male presents for evaluation of flank pain.  Hemodynamically stable. Afebrile.  "Phonating and protecting the airway spontaneously. No clinical evidence for cardiovascular instability or impending airway compromise.  Remainder of physical exam as above.    Additional or Independent Historians available and contributing to the history as above: NONE  Prior medical records, when available, were reviewed. This includes a review of the patients comorbidities, medications, and recent hospital or outpatient notes.  Reviewed notes from 2 days ago.  Patient was evaluated with concern possible urinary tract versus nephrolithiasis.  Started on antibiotics which he was compliant with.  Comorbid Conditions affecting evaluation, treatment or discharge planning: NONE IDENTIFIED   Social Determinates of Health identified and considered in the evaluation and treatment of this patient: None Identified or significantly impacting evaluation and treatment    Differential diagnoses includes but is not limited to:   AAA, aortic dissection, SBO/volvulus, intussusception, ileus, appendicitis, cholecystitis, hepatitis, nephrolithiasis, pancreatitis, IBD/IBS, biliary colic, GERD, PUD, constipation, UTI/pyelonephritis, musculoskeletal pain.  --------------------------------------------------------------------------  All available clinical tests were reviewed by me and documented in the ED course.  Vitals range:   Temp:  [98 °F (36.7 °C)] 98 °F (36.7 °C)  Pulse:  [71-89] 89  Resp:  [16] 16  SpO2:  [100 %] 100 %  BP: (109-124)/(57-65) 109/57  Estimated body mass index is 18.83 kg/m² as calculated from the following:    Height as of 9/30/24: 5' 11" (1.803 m).    Weight as of this encounter: 61.2 kg (135 lb).    ED MEDS GIVEN:  Medications  0.9%  NaCl infusion (has no administration in time range)  morphine injection 4 mg (has no administration in time range)  ondansetron injection 4 mg (has no administration in time range)    Procedures Performed: " None  --------------------------------------------------------------------------  Clinical picture today most consistent with renal colic.  Patient had small stone of the lower pole of the right kidney yesterday.  Given presentation, suspect this has migrated.  Afebrile, nontoxic appearing.  Non peritoneal abdomen.  Will obtain lab work to evaluate for worsening leukocytosis or renal dysfunction.  I do not suspect that additional imaging will be largely revealing.  At this time given afebrile and overall nontoxic appearing, doubt pyelonephritis especially with compliance with broad-spectrum antibiotic coverage.  Patient has remained stable throughout his emergency room visit.  He remains afebrile and nontoxic appearing.  Repeat abdominal exams are benign.  Lab work is reassuring.  Given this, will discharge home with p.o. analgesia and urology follow-up.    DISCLAIMER: This note was prepared with [employer] voice recognition transcription software. Garbled syntax, mangled pronouns, and other bizarre constructions may be attributed to that software system.    Final diagnoses:  [N20.0] Nephrolithiasis (Primary)                Amount and/or Complexity of Data Reviewed  Labs: ordered. Decision-making details documented in ED Course.  Radiology: ordered.    Risk  Prescription drug management.               ED Course as of 10/02/24 0619   Wed Oct 02, 2024   0459 BP: 124/65 [AN]   0459 Temp: 98 °F (36.7 °C) [AN]   0459 Pulse: 71 [AN]   0459 Resp: 16 [AN]   0459 SpO2: 100 % [AN]   0541 CBC W/ AUTO DIFFERENTIAL(!)  No leukocytosis.  Noted anemia H&H 13.2/38.9.  Normal platelets. [AN]   0615 Comp. Metabolic Panel  Metabolic Panel reviewed and interpreted by me:   without clinically significant hypo-or hyper natremia, kalemia, chloridemia, or other electrolyte abnormalities; without new or worsening renal dysfunction; without new or worsening hepatic dysfunction     [AN]   0615 Lipase: 22 [AN]      ED Course User Index  [AN]  Praveen Burgess PA-C                           Clinical Impression:  Final diagnoses:  [N20.0] Nephrolithiasis (Primary)          ED Disposition Condition    Discharge Stable          ED Prescriptions       Medication Sig Dispense Start Date End Date Auth. Provider    HYDROcodone-acetaminophen (NORCO) 5-325 mg per tablet Take 1 tablet by mouth every 6 (six) hours as needed for Pain. 12 tablet 10/2/2024 10/5/2024 Praveen Burgess PA-C          Follow-up Information       Follow up With Specialties Details Why Contact Info    [facility] Emergency Medicine Go to  As needed, If symptoms worsen [address] [facility] [place] [id number] [phone]    Primary Care Manager  Schedule an appointment as soon as possible for a visit in 1 week               Praveen Burgess PA-C  10/02/24 0557       Praveen Burgess PA-C  10/02/24 0619

## 2024-10-02 NOTE — DISCHARGE INSTRUCTIONS
Problem Specific Instructions:  The most effective medication for Kidney Stones is anti-inflammatory medicine like ibuprofen, naproxen, and toradol. Please take medication as prescribed to you today. Strain your urine using the provided strainer so you can see if you have passed the stone. You may have been referred to urology. Return to the Emergency Department if you experience worsening pain, fever, painful urination, blood in urine, weakness, chest pain, difficulty breathing or any other concerning symptoms.     If you received or are discharged with pain medicine or muscle relaxers, understand that they can make you sleepy or impair your judgement. Do not make important decisions, drink, drive, swim or perform any other tasks you would not otherwise perform while impaired for at least 24 hours after your last dose.      Ensure you follow up with your Primary Care Provider or any additional providers listed on this discharge sheet. While you may be healthy enough to go home today, I cannot predict the exact course of your diagnoses. It is important to remember that some problems are difficult to diagnose and may not be found during your first visit. As such, it is your responsibility to monitor symptoms, follow-up with another healthcare provider, or return to the emergency room for new or worsening concerns. Unless otherwise instructed, continue all home medications and any new medications prescribed to you in the Emergency Department.

## 2024-10-04 LAB
C TRACH DNA SPEC QL NAA+PROBE: NOT DETECTED
N GONORRHOEA DNA SPEC QL NAA+PROBE: NOT DETECTED

## 2024-10-10 ENCOUNTER — HOSPITAL ENCOUNTER (EMERGENCY)
Facility: HOSPITAL | Age: 24
Discharge: HOME | End: 2024-10-10
Attending: EMERGENCY MEDICINE
Payer: MEDICAID

## 2024-10-10 VITALS
TEMPERATURE: 98 F | BODY MASS INDEX: 18.9 KG/M2 | HEIGHT: 71 IN | WEIGHT: 135 LBS | DIASTOLIC BLOOD PRESSURE: 56 MMHG | RESPIRATION RATE: 20 BRPM | SYSTOLIC BLOOD PRESSURE: 119 MMHG | OXYGEN SATURATION: 100 % | HEART RATE: 61 BPM

## 2024-10-10 DIAGNOSIS — R10.9 FLANK PAIN: Primary | ICD-10-CM

## 2024-10-10 DIAGNOSIS — N20.0 NEPHROLITHIASIS: ICD-10-CM

## 2024-10-10 LAB
ALBUMIN SERPL BCP-MCNC: 4.5 G/DL (ref 3.5–5.2)
ALP SERPL-CCNC: 74 U/L (ref 55–135)
ALT SERPL W/O P-5'-P-CCNC: 32 U/L (ref 10–44)
ANION GAP SERPL CALC-SCNC: 10 MMOL/L (ref 8–16)
AST SERPL-CCNC: 27 U/L (ref 10–40)
BASOPHILS # BLD AUTO: 0.02 K/UL (ref 0–0.2)
BASOPHILS NFR BLD: 0.7 % (ref 0–1.9)
BILIRUB SERPL-MCNC: 1.3 MG/DL (ref 0.1–1)
BILIRUB UR QL STRIP: NEGATIVE
BUN SERPL-MCNC: 8 MG/DL (ref 6–20)
CALCIUM SERPL-MCNC: 9.6 MG/DL (ref 8.7–10.5)
CHLORIDE SERPL-SCNC: 107 MMOL/L (ref 95–110)
CLARITY UR: ABNORMAL
CO2 SERPL-SCNC: 23 MMOL/L (ref 23–29)
COLOR UR: YELLOW
CREAT SERPL-MCNC: 0.9 MG/DL (ref 0.5–1.4)
DIFFERENTIAL METHOD BLD: ABNORMAL
EOSINOPHIL # BLD AUTO: 0.1 K/UL (ref 0–0.5)
EOSINOPHIL NFR BLD: 4.6 % (ref 0–8)
ERYTHROCYTE [DISTWIDTH] IN BLOOD BY AUTOMATED COUNT: 13.5 % (ref 11.5–14.5)
EST. GFR  (NO RACE VARIABLE): >60 ML/MIN/1.73 M^2
GLUCOSE SERPL-MCNC: 69 MG/DL (ref 70–110)
GLUCOSE UR QL STRIP: NEGATIVE
HCT VFR BLD AUTO: 45.9 % (ref 40–54)
HGB BLD-MCNC: 14.2 G/DL (ref 14–18)
HGB UR QL STRIP: NEGATIVE
IMM GRANULOCYTES # BLD AUTO: 0 K/UL (ref 0–0.04)
IMM GRANULOCYTES NFR BLD AUTO: 0 % (ref 0–0.5)
KETONES UR QL STRIP: NEGATIVE
LEUKOCYTE ESTERASE UR QL STRIP: NEGATIVE
LYMPHOCYTES # BLD AUTO: 1.2 K/UL (ref 1–4.8)
LYMPHOCYTES NFR BLD: 42.4 % (ref 18–48)
MCH RBC QN AUTO: 29 PG (ref 27–31)
MCHC RBC AUTO-ENTMCNC: 30.9 G/DL (ref 32–36)
MCV RBC AUTO: 94 FL (ref 82–98)
MONOCYTES # BLD AUTO: 0.3 K/UL (ref 0.3–1)
MONOCYTES NFR BLD: 10.2 % (ref 4–15)
NEUTROPHILS # BLD AUTO: 1.2 K/UL (ref 1.8–7.7)
NEUTROPHILS NFR BLD: 42.1 % (ref 38–73)
NITRITE UR QL STRIP: NEGATIVE
NRBC BLD-RTO: 0 /100 WBC
PH UR STRIP: 8 [PH] (ref 5–8)
PLATELET # BLD AUTO: 266 K/UL (ref 150–450)
PMV BLD AUTO: 9.6 FL (ref 9.2–12.9)
POTASSIUM SERPL-SCNC: 4.1 MMOL/L (ref 3.5–5.1)
PROT SERPL-MCNC: 7.5 G/DL (ref 6–8.4)
PROT UR QL STRIP: NEGATIVE
RBC # BLD AUTO: 4.9 M/UL (ref 4.6–6.2)
SODIUM SERPL-SCNC: 140 MMOL/L (ref 136–145)
SP GR UR STRIP: 1.02 (ref 1–1.03)
URN SPEC COLLECT METH UR: ABNORMAL
UROBILINOGEN UR STRIP-ACNC: NEGATIVE EU/DL
WBC # BLD AUTO: 2.83 K/UL (ref 3.9–12.7)

## 2024-10-10 PROCEDURE — 80053 COMPREHEN METABOLIC PANEL: CPT | Performed by: PHYSICIAN ASSISTANT

## 2024-10-10 PROCEDURE — 85025 COMPLETE CBC W/AUTO DIFF WBC: CPT | Performed by: PHYSICIAN ASSISTANT

## 2024-10-10 PROCEDURE — 81003 URINALYSIS AUTO W/O SCOPE: CPT | Performed by: PHYSICIAN ASSISTANT

## 2024-10-10 PROCEDURE — 96374 THER/PROPH/DIAG INJ IV PUSH: CPT

## 2024-10-10 PROCEDURE — 63600175 PHARM REV CODE 636 W HCPCS

## 2024-10-10 PROCEDURE — 99285 EMERGENCY DEPT VISIT HI MDM: CPT | Mod: 25

## 2024-10-10 RX ORDER — HYDROCODONE BITARTRATE AND ACETAMINOPHEN 5; 325 MG/1; MG/1
1 TABLET ORAL EVERY 6 HOURS PRN
Qty: 6 TABLET | Refills: 0 | Status: SHIPPED | OUTPATIENT
Start: 2024-10-10

## 2024-10-10 RX ORDER — MORPHINE SULFATE 4 MG/ML
4 INJECTION, SOLUTION INTRAMUSCULAR; INTRAVENOUS
Status: COMPLETED | OUTPATIENT
Start: 2024-10-10 | End: 2024-10-10

## 2024-10-10 RX ADMIN — MORPHINE SULFATE 4 MG: 4 INJECTION, SOLUTION INTRAMUSCULAR; INTRAVENOUS at 01:10

## 2024-10-10 NOTE — ED PROVIDER NOTES
Encounter Date: 10/10/2024       History     Chief Complaint   Patient presents with    Flank Pain     RIGHT flank pain x several days. Was seen for same on 10/2 and dx with kidney stones. States came in today bevause he has not heard for urology office for appt, and needs a work note. Using norco with improvement.      Patient is a 24-year-old male presenting for right flank pain.  States he has been having this pain for months and was previously told from prior ED visits he has a kidney stone.  States recently the pain has gotten worse and he has yet to pass the stone yet.  Denies measurable fever at home, but states he is having chills.  States he had 1 episode of vomiting a few days ago but since then has been nauseous.  Denies hematochezia or urinary frequency.  States sometimes he feels like he is not able to fully express all of his urine.  He was recently here on 10/2 for the kidney stone and was referred to Urology.  States he has not heard from Urology to have the stone removed and is here to look into getting the stone removed.        Review of patient's allergies indicates:  No Known Allergies  Past Medical History:   Diagnosis Date    Hives     large whelps several times in the past    Laryngeal edema allergic agent unknown    history of allergic reactions     Past Surgical History:   Procedure Laterality Date    TYMPANOPLASTY      right ear     Family History   Problem Relation Name Age of Onset    Cancer Maternal Grandmother          stomach cancer    Stroke Maternal Grandfather      Hypertension Maternal Grandfather      Hypertension Paternal Grandfather       Social History     Tobacco Use    Smoking status: Never     Passive exposure: Never    Smokeless tobacco: Never   Substance Use Topics    Alcohol use: No    Drug use: No     Review of Systems   Constitutional:  Positive for chills. Negative for fever.   Respiratory:  Negative for cough and shortness of breath.    Cardiovascular:  Negative for  chest pain.   Gastrointestinal:  Positive for nausea and vomiting. Negative for abdominal pain.   Genitourinary:  Positive for flank pain. Negative for difficulty urinating and hematuria.   Musculoskeletal:  Negative for arthralgias.   Skin:  Negative for color change.   Neurological:  Negative for light-headedness and headaches.       Physical Exam     Initial Vitals [10/10/24 1032]   BP Pulse Resp Temp SpO2   111/65 86 17 98 °F (36.7 °C) 100 %      MAP       --         Physical Exam    Constitutional: He appears well-developed and well-nourished.   HENT:   Head: Normocephalic and atraumatic.   Eyes: Conjunctivae and EOM are normal. Pupils are equal, round, and reactive to light.   Neck:   Normal range of motion.  Cardiovascular:  Normal rate and regular rhythm.           Pulmonary/Chest: Breath sounds normal. No respiratory distress. He has no wheezes. He has no rhonchi. He has no rales.   Abdominal: Abdomen is soft. Bowel sounds are normal. There is abdominal tenderness in the right upper quadrant and right lower quadrant.   There is right CVA tenderness.  Musculoskeletal:         General: Normal range of motion.      Cervical back: Normal range of motion.     Neurological: He is alert and oriented to person, place, and time.   Skin: Skin is warm and dry. Capillary refill takes less than 2 seconds.   Psychiatric: He has a normal mood and affect.         ED Course   Procedures  Labs Reviewed   CBC W/ AUTO DIFFERENTIAL - Abnormal       Result Value    WBC 2.83 (*)     RBC 4.90      Hemoglobin 14.2      Hematocrit 45.9      MCV 94      MCH 29.0      MCHC 30.9 (*)     RDW 13.5      Platelets 266      MPV 9.6      Immature Granulocytes 0.0      Gran # (ANC) 1.2 (*)     Immature Grans (Abs) 0.00      Lymph # 1.2      Mono # 0.3      Eos # 0.1      Baso # 0.02      nRBC 0      Gran % 42.1      Lymph % 42.4      Mono % 10.2      Eosinophil % 4.6      Basophil % 0.7      Differential Method Automated     COMPREHENSIVE  METABOLIC PANEL - Abnormal    Sodium 140      Potassium 4.1      Chloride 107      CO2 23      Glucose 69 (*)     BUN 8      Creatinine 0.9      Calcium 9.6      Total Protein 7.5      Albumin 4.5      Total Bilirubin 1.3 (*)     Alkaline Phosphatase 74      AST 27      ALT 32      eGFR >60      Anion Gap 10     URINALYSIS, REFLEX TO URINE CULTURE - Abnormal    Specimen UA Urine, Clean Catch      Color, UA Yellow      Appearance, UA Hazy (*)     pH, UA 8.0      Specific Gravity, UA 1.020      Protein, UA Negative      Glucose, UA Negative      Ketones, UA Negative      Bilirubin (UA) Negative      Occult Blood UA Negative      Nitrite, UA Negative      Urobilinogen, UA Negative      Leukocytes, UA Negative      Narrative:     Specimen Source->Urine          Imaging Results              US Retroperitoneal Complete (Final result)  Result time 10/10/24 12:27:20      Final result by Tristin Cummings MD (10/10/24 12:27:20)                   Impression:      Multiple small renal stones bilaterally.  No hydronephrosis.      Electronically signed by: Tristin Cummings MD  Date:    10/10/2024  Time:    12:27               Narrative:    EXAMINATION:  US RETROPERITONEAL COMPLETE    CLINICAL HISTORY:  R flank pain;    TECHNIQUE:  Ultrasound of the kidneys and urinary bladder was performed including color flow and Doppler evaluation of the kidneys.    COMPARISON:  09/30/2024    FINDINGS:  Right kidney: The right kidney measures 11.2 cm. No cortical thinning. No loss of corticomedullary distinction. Resistive index measures 0.57.  No mass. There are approximately 5 small renal stones largest measures 6 mm.  No hydronephrosis.    Left kidney: The left kidney measures 10.1 cm. No cortical thinning. No loss of corticomedullary distinction. Resistive index measures 0.48.  No mass. There are approximately 3 small renal stones largest measures 5 mm.  No hydronephrosis.    The bladder is partially distended at the time of scanning and has  an unremarkable appearance.                                       Medications   morphine injection 4 mg (4 mg Intravenous Given 10/10/24 1313)     Medical Decision Making  Patient is a 24-year-old male presenting for right flank pain.  CT abdomen and pelvis on 09/30 reveals right nonobstructing nephrolithiasis.     Differential includes but not limited to nephrolithiasis, UTI, pyelonephritis, lumbar strain.    Patient is alert and afebrile.  Vitals are stable.  Physical exam is feels mild right-sided abdominal tenderness.  Extreme CVA tenderness to the right side noted. Pt exhibiting renal colic, unable to lay still in comfortable position as pain increased.     CBC and CMP unremarkable.  Ultrasound of retroperitoneal reveals multiple small stones bilaterally.  No hydronephrosis seen.  Urinalysis negative for infection thus less likely UTI or pyelonephritis.    Patient given morphine for pain.  Upon re-evaluation patient states his pain is better.  Discussed with patient need to follow up with outpatient Urology for further evaluation.  Referral number and phone number given to clinic for for patient to contact if he does not receive a call.  Discussed with patient to take Tylenol or ibuprofen for pain as needed. Patient prescribed Norco for increased pain. Recommended to follow up PCP in 1 week.  Return precautions advised for new or worsening symptoms.  Patient expressed understanding of return precautions and follow up plan.    Amount and/or Complexity of Data Reviewed  Labs: ordered.  Radiology: ordered.    Risk  Prescription drug management.                                      Clinical Impression:  Final diagnoses:  [R10.9] Flank pain (Primary)  [N20.0] Nephrolithiasis          ED Disposition Condition    Discharge Stable          ED Prescriptions       Medication Sig Dispense Start Date End Date Auth. Provider    HYDROcodone-acetaminophen (NORCO) 5-325 mg per tablet Take 1 tablet by mouth every 6 (six) hours  as needed for Pain. 6 tablet 10/10/2024 -- Dianna Reza PA          Follow-up Information       Follow up With Specialties Details Why Contact Info Additional Information    [facility] Urology Schedule an appointment as soon as possible for a visit in 2 days Referral number :[id number] [address]  [address]  [facility] [address]  [phone] Please park in garage or Medical Ofc Bldg surface lot and check in at Medical Office Building [address].     [facility] Emergency Dept Emergency Medicine Go to  If new symptoms develop or symptoms worsen [address] [facility] [place] [id number] [phone]              Dianna Reza PA  10/10/24 1522

## 2024-10-10 NOTE — DISCHARGE INSTRUCTIONS
Thank you for coming to our Emergency Department today. It is important to remember that some problems or medical conditions are difficult to diagnose and may not be found or addressed during your Emergency Department visit.  These conditions often start with non-specific symptoms and can only be diagnosed on follow up visits with your primary care physician or specialist when the symptoms continue or change. Please remember that all medical conditions can change, and we cannot predict how you will be feeling tomorrow or the next day. Return to the ER with any questions/concerns, new/concerning symptoms, worsening or failure to improve.       Be sure to follow up with your primary care doctor and review all labs/imaging/tests that were performed during your ER visit with them. It is very common for us to identify non-emergent incidental findings which must be followed up with your primary care physician.  Some labs/imaging/tests may be outside of the normal range, and require non-emergent follow-up and/or further investigation/treatment/procedures/testing to help diagnose/exclude/prevent complications or other potentially serious medical conditions. Some abnormalities may not have been discussed or addressed during your ER visit.     An ER visit does not replace a primary care visit, and many screening tests or follow-up tests cannot be ordered by an ER doctor or performed by the ER. Some tests may even require pre-approval.    If you do not have a primary care doctor, you may contact the one listed on your discharge paperwork or you may also call the [facility] Appointment Desk at [phone] , or [address] [place] [address] at  [phone] to schedule an appointment, or establish care with a primary care doctor or even a specialist and to obtain information about local resources. It is important to your health that you have a primary care doctor.    Please take all medications as directed. We have done our best to select  a medication for you that will treat your condition however, all medications may potentially have side-effects and it is impossible to predict which medications may give you side-effects or what those side-effects (if any) those medications may give you.  If you feel that you are having a negative effect or side-effect of any medication you should stop taking those medications immediately and seek medical attention. If you feel that you are having a life-threatening reaction call 911.        Do not drive, swim, climb to height, take a bath, operate heavy machinery, drink alcohol or take potentially sedating medications, sign any legal documents or make any important decisions for 24 hours if you have received any pain medications, sedatives or mood altering drugs during your ER visit or within 24 hours of taking them if they have been prescribed to you.     You can find additional resources for Dentists, hearing aids, durable medical equipment, low cost pharmacies and other resources at [email or web address]

## 2024-10-10 NOTE — Clinical Note
"Shade"Amparo Neal was seen and treated in our emergency department on 10/10/2024.  He may return to work on 10/15/2024.       If you have any questions or concerns, please don't hesitate to call.      Dang Back PA-C"

## 2024-11-01 ENCOUNTER — OFFICE VISIT (OUTPATIENT)
Facility: CLINIC | Age: 24
End: 2024-11-01
Payer: MEDICAID

## 2024-11-01 VITALS
BODY MASS INDEX: 18.92 KG/M2 | HEIGHT: 71 IN | OXYGEN SATURATION: 100 % | HEART RATE: 75 BPM | WEIGHT: 135.13 LBS | SYSTOLIC BLOOD PRESSURE: 120 MMHG | TEMPERATURE: 97 F | DIASTOLIC BLOOD PRESSURE: 84 MMHG | RESPIRATION RATE: 18 BRPM

## 2024-11-01 DIAGNOSIS — Z00.00 ANNUAL PHYSICAL EXAM: ICD-10-CM

## 2024-11-01 DIAGNOSIS — N20.0 KIDNEY STONE: Primary | ICD-10-CM

## 2024-11-01 PROCEDURE — 99213 OFFICE O/P EST LOW 20 MIN: CPT | Mod: S$PBB,25,,

## 2024-11-01 PROCEDURE — 3044F HG A1C LEVEL LT 7.0%: CPT | Mod: CPTII,,,

## 2024-11-01 PROCEDURE — 99214 OFFICE O/P EST MOD 30 MIN: CPT | Mod: PBBFAC,PN

## 2024-11-01 PROCEDURE — 99999 PR PBB SHADOW E&M-EST. PATIENT-LVL IV: CPT | Mod: PBBFAC,,,

## 2024-11-01 PROCEDURE — 3008F BODY MASS INDEX DOCD: CPT | Mod: CPTII,,,

## 2024-11-01 PROCEDURE — 3074F SYST BP LT 130 MM HG: CPT | Mod: CPTII,,,

## 2024-11-01 PROCEDURE — 1160F RVW MEDS BY RX/DR IN RCRD: CPT | Mod: CPTII,,,

## 2024-11-01 PROCEDURE — 99395 PREV VISIT EST AGE 18-39: CPT | Mod: S$PBB,,,

## 2024-11-01 PROCEDURE — 3079F DIAST BP 80-89 MM HG: CPT | Mod: CPTII,,,

## 2024-11-01 PROCEDURE — 1159F MED LIST DOCD IN RCRD: CPT | Mod: CPTII,,,

## 2024-11-01 NOTE — PROGRESS NOTES
SUBJECTIVE     Chief Complaint   Patient presents with    Establish Care     Pt states he is here to [facility]  Shade Neal Jr. is a 24 y.o. male with multiple medical diagnoses as listed in the medical history and problem list that presents for evaluation.    History of Present Illness    CHIEF COMPLAINT:  Mr. Neal presents today for evaluation of kidney stones.    KIDNEY STONES:  He reports multiple kidney stones present bilaterally, experiencing severe pain that worsens with movement. This condition has significantly impacted his daily life, leading to a recent emergency room visit due to pain intensity. He denies chronic urinary tract infections or painful/burning urination.    MEDICAL HISTORY:  He reports no current primary care physician. Blood work was done approximately 6 months ago, ordered by a briefly seen primary doctor.    MEDICATIONS:  He was previously prescribed Flomax to increase urination in an attempt to break down kidney stones.    LIFESTYLE:  He primarily drinks tea and water. His diet consists of significant amounts of fast food and frequent eating out. He is a former smoker and denies current tobacco use.      ROS:  General: -fever, -chills, -fatigue, -weight gain, -weight loss  Eyes: -vision changes, -redness, -discharge  ENT: -ear pain, -nasal congestion, -sore throat  Cardiovascular: -chest pain, -palpitations, -lower extremity edema  Respiratory: -cough, -shortness of breath  Gastrointestinal: -abdominal pain, -nausea, -vomiting, -diarrhea, -constipation, -blood in stool  Genitourinary: -dysuria, -hematuria, -frequency, -painful urination  Musculoskeletal: -joint pain, -muscle pain  Skin: -rash, -lesion  Neurological: -headache, -dizziness, -numbness, -tingling  Psychiatric: -anxiety, -depression, -sleep difficulty       PAST MEDICAL HISTORY:  Past Medical History:   Diagnosis Date    Hives     large whelps several times in the past    Laryngeal edema allergic agent  unknown    history of allergic reactions       PAST SURGICAL HISTORY:  Past Surgical History:   Procedure Laterality Date    TYMPANOPLASTY      right ear       SOCIAL HISTORY:  Social History     Socioeconomic History    Marital status: Single   Tobacco Use    Smoking status: Never     Passive exposure: Never    Smokeless tobacco: Never   Substance and Sexual Activity    Alcohol use: No    Drug use: No    Sexual activity: Yes     Partners: Female       FAMILY HISTORY:  Family History   Problem Relation Name Age of Onset    No Known Problems Mother      No Known Problems Father      No Known Problems Sister      No Known Problems Brother      No Known Problems Daughter      No Known Problems Son      No Known Problems Maternal Aunt      No Known Problems Maternal Uncle      No Known Problems Paternal Aunt      No Known Problems Paternal Uncle      Cancer Maternal Grandmother          stomach cancer    Stroke Maternal Grandfather      Hypertension Maternal Grandfather      Hypertension Paternal Grandfather         ALLERGIES AND MEDICATIONS: updated and reviewed.  Review of patient's allergies indicates:  No Known Allergies  Current Outpatient Medications   Medication Sig Dispense Refill    HYDROcodone-acetaminophen (NORCO) 5-325 mg per tablet Take 1 tablet by mouth every 6 (six) hours as needed for Pain. 6 tablet 0    EPINEPHrine (EPIPEN) 0.3 mg/0.3 mL AtIn Inject 0.3 mLs (0.3 mg total) into the muscle as needed (severe allergic reaction). 2 Device 1    famotidine (PEPCID) 20 MG tablet Take 1 tablet (20 mg total) by mouth 2 (two) times daily. 60 tablet 0    meloxicam (MOBIC) 15 MG tablet Take 1 tablet (15 mg total) by mouth once daily. (Patient not taking: Reported on 11/1/2024) 30 tablet 0    naproxen (NAPROSYN) 500 MG tablet Take 1 tablet (500 mg total) by mouth 2 (two) times daily with meals. (Patient not taking: Reported on 11/1/2024) 20 tablet 0    ondansetron (ZOFRAN-ODT) 8 MG TbDL Take 1 tablet (8 mg total) by  "mouth every 6 (six) hours as needed. (Patient not taking: Reported on 11/1/2024) 30 tablet 0    tamsulosin (FLOMAX) 0.4 mg Cap Take 1 capsule (0.4 mg total) by mouth once daily. 30 capsule 0     No current facility-administered medications for this visit.       OBJECTIVE     Physical Exam  Vitals:    11/01/24 1610   BP: 120/84   Pulse: 75   Resp: 18   Temp: 96.5 °F (35.8 °C)    Body mass index is 18.85 kg/m².  Weight: 61.3 kg (135 lb 2.3 oz)   Height: 5' 11" (180.3 cm)     Physical Exam  Constitutional:       General: He is not in acute distress.     Appearance: Normal appearance. He is not ill-appearing or diaphoretic.   HENT:      Right Ear: Tympanic membrane normal. There is no impacted cerumen.      Left Ear: Tympanic membrane normal. There is no impacted cerumen.      Nose: Nose normal. No congestion or rhinorrhea.      Mouth/Throat:      Mouth: Mucous membranes are moist.      Pharynx: Oropharynx is clear. No oropharyngeal exudate or posterior oropharyngeal erythema.   Eyes:      General:         Right eye: No discharge.         Left eye: No discharge.   Cardiovascular:      Rate and Rhythm: Normal rate and regular rhythm.      Pulses: Normal pulses.      Heart sounds: Normal heart sounds.   Pulmonary:      Effort: Pulmonary effort is normal.      Breath sounds: Normal breath sounds.   Abdominal:      General: Bowel sounds are normal. There is no distension.      Palpations: Abdomen is soft.      Tenderness: There is no abdominal tenderness. There is no guarding.   Musculoskeletal:         General: No swelling or tenderness.      Cervical back: Normal range of motion. No rigidity or tenderness.      Right lower leg: No edema.      Left lower leg: No edema.   Skin:     General: Skin is warm and dry.      Findings: No bruising, erythema, lesion or rash.   Neurological:      Mental Status: He is alert and oriented to person, place, and time.      Motor: No weakness.      Gait: Gait normal.   Psychiatric:         " Mood and Affect: Mood normal.         Behavior: Behavior normal.         Health Maintenance         Date Due Completion Date    Influenza Vaccine (1) 09/01/2024 1/26/2017    COVID-19 Vaccine (1 - 2024-25 season) Never done ---    TETANUS VACCINE 05/29/2031 5/29/2021    RSV Vaccine (Age 60+ and Pregnant patients) (1 - 1-dose 75+ series) 03/11/2074 ---              ASSESSMENT     24 y.o. male with     1. Kidney stone    2. Annual physical exam        PLAN:     1. Kidney stone  New; Assessing   -Reviewed patient's chart and prior visits, noting existing urology referral from ER  -Assessed kidney stone symptoms and impact on patient's quality of life  -Evaluated potential causes of kidney stones, including dehydration, elevated sodium intake, and possible genetic predisposition    2. Annual physical exam  Counseled on age appropriate medical preventative services, including age appropriate cancer screenings, over all nutritional health, need for a consistent exercise regimen and an over all push towards maintaining a vigorous and active lifestyle.  Counseled on age appropriate vaccines and discussed upcoming health care needs based on age/gender.  Spent time with patient counseling on need for a good patient/provider relationship moving forward.  Discussed use of common OTC medications and supplements.  Discussed common dietary aids and use of caffeine and the need for good sleep hygiene and stress management.  - Hemoglobin A1C; Future  - Lipid Panel; Future  - TSH; Future  - HIV 1/2 Ag/Ab (4th Gen); Future  - HEPATITIS C ANTIBODY; Future    Assessment & Plan    Determined need for additional lab work to assess overall health status    KIDNEY STONES:  - Explained causes of kidney stones, including dehydration, elevated sodium intake, excessive protein consumption, and foods elevated in oxalate.  - Discussed the importance of hydration, particularly water intake, in preventing kidney stone formation.  - Advised against  drinking Gatorade due to elevated sodium content, explaining it is meant for electrolyte replacement during intense physical activity.  - Clarified the difference between fluids and water intake for kidney stone prevention.  - Mr. Neal to increase water intake significantly.  - Mr. Neal to avoid Gatorade and other elevated-sodium beverages.  - Mr. Neal to reduce fast food and elevated-sodium food consumption.    LABS:  - Ordered CBC, diabetes screening, cholesterol check, and other tests to be completed before follow-up visit.  - Complete ordered lab work before the follow-up visit.    FOLLOW UP:  - Follow up in 3 weeks.  - Contact the office on Monday to confirm urology appointment details.         Vijay Diaz DNP, APRN, FNP-BC  [facility]  11/16/2024 4:26 PM    This note was generated with the assistance of ambient listening technology. Verbal consent was obtained by the patient and accompanying visitor(s) for the recording of patient appointment to facilitate this note. I attest to having reviewed and edited the generated note for accuracy, though some syntax or spelling errors may persist. Please contact the author of this note for any clarification.

## 2024-11-08 ENCOUNTER — LAB VISIT (OUTPATIENT)
Dept: LAB | Facility: HOSPITAL | Age: 24
End: 2024-11-08
Payer: MEDICAID

## 2024-11-08 DIAGNOSIS — Z00.00 ANNUAL PHYSICAL EXAM: Primary | ICD-10-CM

## 2024-11-08 DIAGNOSIS — Z00.00 ANNUAL PHYSICAL EXAM: ICD-10-CM

## 2024-11-08 LAB
CHOLEST SERPL-MCNC: 113 MG/DL (ref 120–199)
CHOLEST/HDLC SERPL: 2.4 {RATIO} (ref 2–5)
HDLC SERPL-MCNC: 47 MG/DL (ref 40–75)
HDLC SERPL: 41.6 % (ref 20–50)
LDLC SERPL CALC-MCNC: 56.6 MG/DL (ref 63–159)
NONHDLC SERPL-MCNC: 66 MG/DL
TRIGL SERPL-MCNC: 47 MG/DL (ref 30–150)
TSH SERPL DL<=0.005 MIU/L-ACNC: 0.48 UIU/ML (ref 0.4–4)

## 2024-11-08 PROCEDURE — 84443 ASSAY THYROID STIM HORMONE: CPT

## 2024-11-08 PROCEDURE — 80061 LIPID PANEL: CPT

## 2024-11-09 LAB
ESTIMATED AVG GLUCOSE: 97 MG/DL (ref 68–131)
HBA1C MFR BLD: 5 % (ref 4–5.6)
HCV AB SERPL QL IA: NORMAL
HIV 1+2 AB+HIV1 P24 AG SERPL QL IA: NORMAL

## 2024-12-05 DIAGNOSIS — N20.0 KIDNEY STONE: Primary | ICD-10-CM

## 2024-12-05 RX ORDER — DICLOFENAC SODIUM 50 MG/1
50 TABLET, DELAYED RELEASE ORAL 2 TIMES DAILY PRN
Qty: 25 TABLET | Refills: 0 | Status: SHIPPED | OUTPATIENT
Start: 2024-12-05

## 2024-12-09 ENCOUNTER — OFFICE VISIT (OUTPATIENT)
Facility: CLINIC | Age: 24
End: 2024-12-09
Payer: MEDICAID

## 2024-12-09 VITALS
HEIGHT: 71 IN | DIASTOLIC BLOOD PRESSURE: 74 MMHG | TEMPERATURE: 98 F | RESPIRATION RATE: 18 BRPM | WEIGHT: 135.25 LBS | SYSTOLIC BLOOD PRESSURE: 100 MMHG | HEART RATE: 78 BPM | OXYGEN SATURATION: 99 % | BODY MASS INDEX: 18.94 KG/M2

## 2024-12-09 DIAGNOSIS — M54.50 ACUTE RIGHT-SIDED LOW BACK PAIN WITHOUT SCIATICA: Primary | ICD-10-CM

## 2024-12-09 PROCEDURE — 99215 OFFICE O/P EST HI 40 MIN: CPT | Mod: PBBFAC,PN

## 2024-12-09 PROCEDURE — 99999 PR PBB SHADOW E&M-EST. PATIENT-LVL V: CPT | Mod: PBBFAC,,,

## 2024-12-09 PROCEDURE — 3078F DIAST BP <80 MM HG: CPT | Mod: CPTII,,,

## 2024-12-09 PROCEDURE — 3008F BODY MASS INDEX DOCD: CPT | Mod: CPTII,,,

## 2024-12-09 PROCEDURE — 3074F SYST BP LT 130 MM HG: CPT | Mod: CPTII,,,

## 2024-12-09 PROCEDURE — 99214 OFFICE O/P EST MOD 30 MIN: CPT | Mod: S$PBB,,,

## 2024-12-09 PROCEDURE — 3044F HG A1C LEVEL LT 7.0%: CPT | Mod: CPTII,,,

## 2024-12-09 PROCEDURE — 1160F RVW MEDS BY RX/DR IN RCRD: CPT | Mod: CPTII,,,

## 2024-12-09 PROCEDURE — 96372 THER/PROPH/DIAG INJ SC/IM: CPT | Mod: PBBFAC,PN

## 2024-12-09 PROCEDURE — 1159F MED LIST DOCD IN RCRD: CPT | Mod: CPTII,,,

## 2024-12-09 PROCEDURE — 99999PBSHW PR PBB SHADOW TECHNICAL ONLY FILED TO HB: Mod: PBBFAC,,,

## 2024-12-09 RX ORDER — KETOROLAC TROMETHAMINE 30 MG/ML
15 INJECTION, SOLUTION INTRAMUSCULAR; INTRAVENOUS
Status: DISCONTINUED | OUTPATIENT
Start: 2024-12-09 | End: 2024-12-09

## 2024-12-09 RX ORDER — METHOCARBAMOL 500 MG/1
500 TABLET, FILM COATED ORAL 3 TIMES DAILY PRN
Qty: 25 TABLET | Refills: 0 | Status: SHIPPED | OUTPATIENT
Start: 2024-12-09

## 2024-12-09 RX ORDER — KETOROLAC TROMETHAMINE 30 MG/ML
30 INJECTION, SOLUTION INTRAMUSCULAR; INTRAVENOUS
Status: COMPLETED | OUTPATIENT
Start: 2024-12-09 | End: 2024-12-09

## 2024-12-09 RX ADMIN — KETOROLAC TROMETHAMINE 30 MG: 30 INJECTION, SOLUTION INTRAMUSCULAR; INTRAVENOUS at 10:12

## 2024-12-09 NOTE — PROGRESS NOTES
SUBJECTIVE     Chief Complaint   Patient presents with    Nephrolithiasis     Pt is coming in for a f/u of kidney stones       HPI  Shade Neal Jr. is a 24 y.o. male with multiple medical diagnoses as listed in the medical history and problem list that presents for follow-up.    History of Present Illness    CHIEF COMPLAINT:  Mr. Neal presents today for ongoing flank pain.    FLANK PAIN:  He reports persistent right flank pain for an extended period, which has continued even after urological intervention. The exact duration and severity are not specified. Previous treatments for pain include medication and injections. He took pain medication that was effective for acute pain but not suitable for daily use. Injections provided only partial relief, with one provider informing him it might alleviate the pain slightly but may not completely resolve it. When medication failed to control his pain, he was advised to follow up with his primary care physician.    UROLOGICAL HISTORY:  He has a history of kidney stones. A recent CT revealed a non-obstructive 2mm right renal stone, along with several small adherent stones. He underwent stone removal surgery with stent placement, which was subsequently removed. Despite these interventions, he continues to experience similar pain in the flank area. The urologist has ruled out urologic or kidney-related sources as the cause of the ongoing pain. He denies any current urinary symptoms such as burning or discomfort during urination.    MEDICATIONS:  He recalls being prescribed Tamsulosin (Flomax), though he is unsure if it was before or after a urological procedure. At one point, he was given medication to help him urinate, but it was ineffective.    IMAGING AND DIAGNOSTIC TESTS:  He had a CT performed less than a month ago and also mentions undergoing a scope procedure. He does not expect any additional imaging to be performed in the near future, as he believes the  results would be unchanged from the recent studies.      ROS:  General: -fever, -chills, -fatigue, -weight gain, -weight loss  Eyes: -vision changes, -redness, -discharge  ENT: -ear pain, -nasal congestion, -sore throat  Cardiovascular: -chest pain, -palpitations, -lower extremity edema  Respiratory: -cough, -shortness of breath  Gastrointestinal: -abdominal pain, -nausea, -vomiting, -diarrhea, -constipation, -blood in stool  Genitourinary: -dysuria, -hematuria, -frequency, -painful urination  Musculoskeletal: -joint pain, +muscle pain, +back pain  Skin: -rash, -lesion  Neurological: -headache, -dizziness, -numbness, -tingling  Psychiatric: -anxiety, -depression, -sleep difficulty         PAST MEDICAL HISTORY:  Past Medical History:   Diagnosis Date    Hives     large whelps several times in the past    Laryngeal edema allergic agent unknown    history of allergic reactions       PAST SURGICAL HISTORY:  Past Surgical History:   Procedure Laterality Date    TYMPANOPLASTY      right ear       SOCIAL HISTORY:  Social History     Socioeconomic History    Marital status: Single   Tobacco Use    Smoking status: Never     Passive exposure: Never    Smokeless tobacco: Never   Substance and Sexual Activity    Alcohol use: No    Drug use: No    Sexual activity: Yes     Partners: Female       FAMILY HISTORY:  Family History   Problem Relation Name Age of Onset    No Known Problems Mother      No Known Problems Father      No Known Problems Sister      No Known Problems Brother      No Known Problems Daughter      No Known Problems Son      No Known Problems Maternal Aunt      No Known Problems Maternal Uncle      No Known Problems Paternal Aunt      No Known Problems Paternal Uncle      Cancer Maternal Grandmother          stomach cancer    Stroke Maternal Grandfather      Hypertension Maternal Grandfather      Hypertension Paternal Grandfather         ALLERGIES AND MEDICATIONS: updated and reviewed.  Review of patient's  "allergies indicates:  No Known Allergies  Current Outpatient Medications   Medication Sig Dispense Refill    diclofenac (VOLTAREN) 50 MG EC tablet Take 1 tablet (50 mg total) by mouth 2 (two) times daily as needed (pain). 25 tablet 0    HYDROcodone-acetaminophen (NORCO) 5-325 mg per tablet Take 1 tablet by mouth every 6 (six) hours as needed for Pain. 6 tablet 0    EPINEPHrine (EPIPEN) 0.3 mg/0.3 mL AtIn Inject 0.3 mLs (0.3 mg total) into the muscle as needed (severe allergic reaction). 2 Device 1    famotidine (PEPCID) 20 MG tablet Take 1 tablet (20 mg total) by mouth 2 (two) times daily. 60 tablet 0    methocarbamoL (ROBAXIN) 500 MG Tab Take 1 tablet (500 mg total) by mouth 3 (three) times daily as needed (muscle spasms). 25 tablet 0    ondansetron (ZOFRAN-ODT) 8 MG TbDL Take 1 tablet (8 mg total) by mouth every 6 (six) hours as needed. (Patient not taking: Reported on 12/9/2024) 30 tablet 0    tamsulosin (FLOMAX) 0.4 mg Cap Take 1 capsule (0.4 mg total) by mouth once daily. 30 capsule 0     No current facility-administered medications for this visit.       OBJECTIVE     Physical Exam  Vitals:    12/09/24 0935   BP: 100/74   Pulse: 78   Resp: 18   Temp: 98 °F (36.7 °C)    Body mass index is 18.86 kg/m².  Weight: 61.4 kg (135 lb 4 oz)   Height: 5' 11" (180.3 cm)     Physical Exam  Constitutional:       General: He is not in acute distress.     Appearance: Normal appearance. He is not ill-appearing or diaphoretic.   HENT:      Right Ear: Tympanic membrane normal. There is no impacted cerumen.      Left Ear: Tympanic membrane normal. There is no impacted cerumen.      Nose: Nose normal. No congestion or rhinorrhea.      Mouth/Throat:      Mouth: Mucous membranes are moist.      Pharynx: Oropharynx is clear. No oropharyngeal exudate or posterior oropharyngeal erythema.   Eyes:      General:         Right eye: No discharge.         Left eye: No discharge.   Cardiovascular:      Rate and Rhythm: Normal rate and regular " rhythm.      Pulses: Normal pulses.      Heart sounds: Normal heart sounds.   Pulmonary:      Effort: Pulmonary effort is normal.      Breath sounds: Normal breath sounds.   Abdominal:      General: Bowel sounds are normal. There is no distension.      Palpations: Abdomen is soft.      Tenderness: There is no abdominal tenderness. There is no guarding.   Musculoskeletal:         General: No swelling or tenderness.      Cervical back: Normal range of motion. No rigidity or tenderness.      Right lower leg: No edema.      Left lower leg: No edema.   Skin:     General: Skin is warm and dry.      Findings: No bruising, erythema, lesion or rash.   Neurological:      Mental Status: He is alert and oriented to person, place, and time.      Motor: No weakness.      Gait: Gait normal.   Psychiatric:         Mood and Affect: Mood normal.         Behavior: Behavior normal.         Health Maintenance         Date Due Completion Date    Influenza Vaccine (1) 09/01/2024 1/26/2017    COVID-19 Vaccine (1 - 2024-25 season) Never done ---    TETANUS VACCINE 05/29/2031 5/29/2021    RSV Vaccine (Age 60+ and Pregnant patients) (1 - 1-dose 75+ series) 03/11/2074 ---              ASSESSMENT     24 y.o. male with     1. Acute right-sided low back pain without sciatica        PLAN:     1. Acute right-sided low back pain without sciatica  New; Treating  - Ambulatory referral/consult to Physical/Occupational Therapy; Future  - methocarbamoL (ROBAXIN) 500 MG Tab; Take 1 tablet (500 mg total) by mouth 3 (three) times daily as needed (muscle spasms).  Dispense: 25 tablet; Refill: 0  - Ambulatory referral/consult to Pain Clinic; Future  - ketorolac injection 30 mg      Assessment & Plan    IMPRESSION:  - Ruled out kidney/urologic source of patient's flank pain based on urologist's assessment and normal kidney function tests  - Assessed pain as likely muscular in origin, possibly due to strain or tear  - Will treat with muscle relaxant and  injection for pain relief  - Ordered physical therapy and pain management referrals to further investigate and treat ongoing pain    LOW BACK PAIN AND MUSCLE SPASM:  - Started muscle relaxer: Take up to 3 times daily, preferably in the evening due to potential drowsiness.  - Discussed that muscle relaxers may cause drowsiness and affect ability to work.  - Pain injection to be administered in office.  - Referred to physical therapy for flank pain.  - Referred to pain management for ongoing pain evaluation and treatment.    KIDNEY FUNCTION:  - Explained that kidney function tests were normal, with filtration rate of 105 being above average for -Americans.    RECENT DIAGNOSTIC TESTS:  - Clarified that CTs and scopes recently performed are unlikely to be repeated soon as results would be the same.    FOLLOW-UP:  - Follow up after Viry (less than 1 month).  - Contact the office if pain persists at same intensity after treatment.       RTC in 4 weeks     I spent a total of 45 minutes on the day of the visit.This includes face to face time and non-face to face time preparing to see the patient (eg, review of tests), obtaining and/or reviewing separately obtained history, documenting clinical information in the electronic or other health record, independently interpreting results and communicating results to the patient/family/caregiver, or care coordinator.     Vijay Diaz DNP, APRN, FNP-BC  [facility]  12/19/2024 10:07 AM      This note was generated with the assistance of ambient listening technology. Verbal consent was obtained by the patient and accompanying visitor(s) for the recording of patient appointment to facilitate this note. I attest to having reviewed and edited the generated note for accuracy, though some syntax or spelling errors may persist. Please contact the author of this note for any clarification.

## 2024-12-09 NOTE — LETTER
December 9, 2024      [facility]  [address]  [place] [address]  Phone: [phone]  Fax: [phone]       Patient: Shade Neal   YOB: 2000  Date of Visit: 12/09/2024    To Whom It May Concern:    Harshal Neal  was at [facility] on 12/09/2024. The patient may return to work/school on 12/11/2024 with no restrictions. If you have any questions or concerns, or if I can be of further assistance, please do not hesitate to contact me.    Sincerely,    Nolvia Mims MA

## 2024-12-10 ENCOUNTER — CLINICAL SUPPORT (OUTPATIENT)
Dept: REHABILITATION | Facility: HOSPITAL | Age: 24
End: 2024-12-10
Payer: MEDICAID

## 2024-12-10 DIAGNOSIS — M54.50 ACUTE RIGHT-SIDED LOW BACK PAIN WITHOUT SCIATICA: ICD-10-CM

## 2024-12-10 DIAGNOSIS — R68.89 DECREASED FUNCTIONAL ACTIVITY TOLERANCE: ICD-10-CM

## 2024-12-10 DIAGNOSIS — R53.1 WEAKNESS: Primary | ICD-10-CM

## 2024-12-10 PROCEDURE — 97110 THERAPEUTIC EXERCISES: CPT | Mod: PN

## 2024-12-10 PROCEDURE — 97140 MANUAL THERAPY 1/> REGIONS: CPT | Mod: PN

## 2024-12-10 PROCEDURE — 97161 PT EVAL LOW COMPLEX 20 MIN: CPT | Mod: PN

## 2024-12-11 PROBLEM — R68.89 DECREASED FUNCTIONAL ACTIVITY TOLERANCE: Status: ACTIVE | Noted: 2024-12-11

## 2024-12-11 PROBLEM — R53.1 WEAKNESS: Status: ACTIVE | Noted: 2024-12-11

## 2024-12-11 NOTE — PLAN OF CARE
[facility]  Physical Therapy Initial Evaluation  Date: 12/10/2024   Name: Shade Neal Jr.  Clinic Number: [id number]    Therapy Diagnosis:   Encounter Diagnoses   Name Primary?    Acute right-sided low back pain without sciatica     Weakness Yes    Decreased functional activity tolerance      Physician: Vijay Diaz DNP    Physician Orders: PT Eval and Treat   Medical Diagnosis from Referral: M54.50 (ICD-10-CM) - Acute right-sided low back pain without sciatica   Evaluation Date: 12/10/2024  Authorization Period Expiration: 12/9/2025  Plan of Care Expiration: 3/10/2025  Visit # / Visits authorized: 1/ 1   Progress Note Due: 1/10/2025  FOTO: 1/ 1    Precautions: Standard    Time In: 1:00pm  Time Out: 2:00pm  Total Appointment Time (timed & untimed codes): 60 minutes    Subjective   Date of onset: 1 year ago  History of current condition - Shade reports: R low back pain and has history of kidney stones but no relief after having them removed last week. He denies radiating symptoms. Pt says that walking for too long causes pain and he has to stop. He mentioned that he was prescribed a muscle relaxer yesterday. Pt also mentioned that he has sleep distburbances due to pain. Pt says despite the pain, he continues on with daily activities.      BRENDEN: none   Any Bowel and Bladder movement issues: none  Any falls: none  Any dizziness: none  Any Headache: none  Any injection in lower back: steroid or epidural: yes  Pain radiates: none   Pain constant or intermitting: intermitting     Pain:  Current 6/10, worst 10/10, best 4/10   Location: R lower back   Description: throbbing, aching   Aggravating Factors: walking too long (30 min - 60 min)  Easing Factors: heat, rest     Prior Therapy: none   Social History:  lives with their family  Occupation: [employer] work   Prior Level of Function: independent   Current Level of Function: independent with some difficulty     Pts goals: back to stop  hurting    Imaging, none:        Medical History:   Past Medical History:   Diagnosis Date    Hives     large whelps several times in the past    Laryngeal edema allergic agent unknown    history of allergic reactions       Surgical History:   Shade Neal Jr.  has a past surgical history that includes Tympanoplasty.    Medications:   Shade has a current medication list which includes the following prescription(s): diclofenac, epinephrine, famotidine, hydrocodone-acetaminophen, methocarbamol, ondansetron, and tamsulosin.    Allergies:   Review of patient's allergies indicates:  No Known Allergies       Objective       Posture Alignment: rounded shoulders       GAIT DEVIATIONS: Shade displays reduced B arm swing , with mild kyphosis , normal stride length     Lumbar Range of Motion:    %   Flexion 100 P!     Extension 100 P!     Left Side Bending 100    Right Side Bending 75 P!   Left rotation   75%   Right Rotation   100%       Passive hip ROM in degrees:     Left Right   IR WFL WFL   ER WFL WFL     Lower Extremity Strength    Right LE  Left LE    Hip flexion: 5/5 Hip flexion: 5/5   Knee extension: 5/5 Knee extension: 5/5   Knee flexion: 5/5 Knee flexion: 5/5   Hip extension:  4+/5 Hip extension: 4+/5   Hip abduction: 4+/5 Hip abduction: 4+/5   Hip adduction: NT Hip adduction NT   Ankle dorsiflexion: 5/5 Ankle dorsiflexion: 5/5   Ankle plantarflexion: 5/5 Ankle plantarflexion: 5/5     Special Tests:  -PATRICIA: negative; mild tightness  -Scour: negative  -Bridge Test: positive: weakness but no pain  -Heel walking: negative  -Toe walking: negative    Palpation: tender R erector spinae , QL     Flexibility:    Ely's test: B mild restriction        PT Evaluation Completed? Yes  Discussed Plan of Care with patient: Yes        CMS Impairment/Limitation/Restriction for FOTO Modified Oswestry Low back Survey    Therapist reviewed FOTO scores for Shade Neal Jr. on 12/10/2024.   FOTO documents entered into EPIC -  see Media section.    Limitation Score: 34% Higher Score= greater disability            TREATMENT   Treatment Time In: 1:35pm  Treatment Time Out: 2:00pm  Total Treatment time separate from Evaluation: 23 minutes    Shade received therapeutic exercises to develop strength and flexibility for 10 minutes including:  Paloff Press Blue TB  Open books  1/2 kneeling QL stretch at wall      Shade received the following manual therapy techniques:  were applied to the: for 13 minutes, including:  STM R paraspinals   Lumbar Breaking bread, QL STM    Shade participated in neuromuscular re-education activities to improve:  for 00 minutes. The following activities were included:      Shade participated in dynamic functional therapeutic activities to improve functional performance for 00  minutes, including:      Shade participated in gait training to improve functional mobility and safety for 00  minutes, including:        Shade received  hot or cold pack for 00 minutes .      Home Exercises and Patient Education Provided    Education provided:   - PT role, intervention rationale     Written Home Exercises Provided: yes.  Exercises were reviewed and Shade was able to demonstrate them prior to the end of the session.  Shade demonstrated good  understanding of the education provided.     See EMR under Patient Instructions for exercises provided 12/10/2024.    Assessment   Shade is a 24 y.o. male referred to outpatient Physical Therapy with a medical diagnosis of low back pain with history of kidney stones. Kidney stone has been removed, but the low back pain is still present. Upon evaluation, pt demonstrated pain, stiffness and weakness which impacts his work duties, recreational activities, and ADLs due to decreased activity tolerance. PT will emphasize impairments in order to restore pt to PLOF.      Pt prognosis is Good.   Pt will benefit from skilled outpatient Physical Therapy to address the deficits stated above and in the  chart below, provide pt/family education, and to maximize pt's level of independence.     Plan of care discussed with patient: Yes  Pt's spiritual, cultural and educational needs considered and patient is agreeable to the plan of care and goals as stated below:     Anticipated Barriers for therapy: none     Medical Necessity is demonstrated by the following  History  Co-morbidities and personal factors that may impact the plan of care [x] LOW: no personal factors / co-morbidities  [] MODERATE: 1-2 personal factors / co-morbidities  [] HIGH: 3+ personal factors / co-morbidities    Moderate / High Support Documentation:   Co-morbidities affecting plan of care:   Hives    large whelps several times in the past   Laryngeal edema allergic agent unknown   history of allergic reactions       Personal Factors:   no deficits     Examination  Body Structures and Functions, activity limitations and participation restrictions that may impact the plan of care [x] LOW: addressing 1-2 elements  [] MODERATE: 3+ elements  [] HIGH: 4+ elements (please support below)    Moderate / High Support Documentation: none      Clinical Presentation [x] LOW: stable  [] MODERATE: Evolving  [] HIGH: Unstable     Decision Making/ Complexity Score: low       GOALS: Short Term Goals:  4 weeks  1. Report decreased in pain at worse less than  <   / =  4  /10  to increase tolerance for functional activities.On going  2. Pt to increase B popliteal angle by greater than > or = 5 degrees in order to improve flexibility and posture. On going  3. Increased Hip MMT 1/2  to increase tolerance for ADL and work activities.On going  4. Pt to increase B Ely's Test by greater than  > or = 5degrees in order to improve flexibility and posture. On going  5. Pt to tolerate HEP to improve ROM and independence with ADL's.On going  6. Pt to improve range of motion by 25% to allow for improved functional mobility to allow for improvement in IADLs. On going    Long Term  Goals: 8 weeks  1. Report decreased in pain at worse less than  <   / =  0  /10  to increase tolerance for functional mobility.  On going  2. Increased Hip MMT 1 grade to increase tolerance for ADL and work activities.On going  3. Pt will report at 5% or less limitation on FOTO Lumbar spine survey  to demonstrate decrease in disability and improvement in back pain.On going  4. Pt to be Independent with HEP to improve ROM and independence with ADL's. On going  5. Pt to increase B Ely's Test by greater than > or = 10 degrees in order to improve flexibility and posture. On going  6. Pt to demonstrate negative Bridge Test in order to show improved core strength for lumbar stabilization. On going  7. Pt to improve range of motion by 75% to allow for improved functional mobility to allow for improvement in IADLs. On going  8. Pt will be able to walk 1 hour without any difficulty in order to show improved tolerance for activities. Ongoing      Plan   Plan of care Certification: 12/10/2024 to 3/10/2025.    Outpatient Physical Therapy 2 times weekly for 8 weeks to include the following interventions: Gait Training, Manual Therapy, Moist Heat/ Ice, Neuromuscular Re-ed, Patient Education, Self Care, Therapeutic Activities, and Therapeutic Exercise. Dry needling    Shiva Martel Jr, PT

## 2024-12-20 DIAGNOSIS — M54.50 ACUTE RIGHT-SIDED LOW BACK PAIN WITHOUT SCIATICA: Primary | ICD-10-CM

## 2024-12-27 ENCOUNTER — CLINICAL SUPPORT (OUTPATIENT)
Dept: REHABILITATION | Facility: HOSPITAL | Age: 24
End: 2024-12-27
Payer: MEDICAID

## 2024-12-27 DIAGNOSIS — R53.1 WEAKNESS: Primary | ICD-10-CM

## 2024-12-27 DIAGNOSIS — R68.89 DECREASED FUNCTIONAL ACTIVITY TOLERANCE: ICD-10-CM

## 2024-12-27 PROCEDURE — 97110 THERAPEUTIC EXERCISES: CPT | Mod: PN

## 2024-12-27 NOTE — PROGRESS NOTES
[facility]   Physical Therapy Treatment Note      Name: Shade Neal Jr.  Clinic Number: [id number]    Therapy Diagnosis:   Encounter Diagnoses   Name Primary?    Weakness Yes    Decreased functional activity tolerance      Physician: Vijay Diaz DNP    Visit Date: 12/27/2024  Physician Orders: PT Eval and Treat   Medical Diagnosis from Referral: M54.50 (ICD-10-CM) - Acute right-sided low back pain without sciatica   Evaluation Date: 12/10/2024  Authorization Period Expiration: 12/9/2025  Plan of Care Expiration: 3/10/2025  Visit # / Visits authorized: 1/ 12   Progress Note Due: 1/10/2025  FOTO: 1/ 1     Precautions: Standard     Time In: 7:00am  Time Out: 8:00am  Total Appointment Time (timed & untimed codes): 60 minutes  Total Billable Time: 53 min    PTA Visit #: /5       Subjective     Patient reports: no pain today, but he has pain after standing for long periods of time. He says that his back has improved some and the home exercises haven't been an issue.   He was compliant with home exercise program.  Response to previous treatment: 1st session   Functional change: ongoing     Pain: 0/10  Location: R low back     Objective      Objective Measures updated at progress report unless specified.     Treatment     Shade received the treatments listed below:      therapeutic exercises to develop flexibility for 10 minutes including:  Nustep 10 min  Elliptical next visit   LTR 2 min   DKTC 2 min   Open books x 15 each   1/2 kneeling QL stretch at wall 6 x 10 sec each   Paloff Press red skinny loop band  2 x 12  Marches with overhead tidal tank isometric hold 2 x 10  Side steps + Monster walks RTB x 2 laps  (1 lap of monster walks due to fatigue)  Wood chops freemotion machine 10#  1 x 12  Lifts freemotion machine 7# 1 x 12    manual therapy techniques:  were applied to the:  for 00 minutes, including:  STM R paraspinals   Lumbar Breaking bread, QL STM    neuromuscular re-education  activities to improve: Core muscle coordination, Proprioception, and Posture for 00 minutes. The following activities were included:    therapeutic activities to improve functional performance for 00  minutes, including:      hot pack for 00 minutes .    cold pack for 00 minutes.    Patient Education and Home Exercises       Education provided:   - PT role     Written Home Exercises Provided: Pt instructed to continue prior HEP. Exercises were reviewed and Shade was able to demonstrate them prior to the end of the session.  Shade demonstrated good  understanding of the education provided. See Electronic Medical Record under Patient Instructions for exercises provided during therapy sessions    Assessment     Pt's first treatment session since evaluation. He demonstrated fatigue during session and required occasional breaks. Emphasis was placed on core strengthening and hip stability as well as mobility. No adverse effects observed and will continue to progress as tolerated to PLOF.     Shade Is progressing well towards his goals.   Patient prognosis is Good.     Patient will continue to benefit from skilled outpatient physical therapy to address the deficits listed in the problem list box on initial evaluation, provide pt/family education and to maximize pt's level of independence in the home and community environment.     Patient's spiritual, cultural and educational needs considered and pt agreeable to plan of care and goals.     Anticipated barriers to physical therapy: none    Goals:   Short Term Goals:  4 weeks  1. Report decreased in pain at worse less than  <   / =  4  /10  to increase tolerance for functional activities.On going  2. Pt to increase B popliteal angle by greater than > or = 5 degrees in order to improve flexibility and posture. On going  3. Increased Hip MMT 1/2  to increase tolerance for ADL and work activities.On going  4. Pt to increase B Ely's Test by greater than  > or = 5degrees in order  to improve flexibility and posture. On going  5. Pt to tolerate HEP to improve ROM and independence with ADL's.On going  6. Pt to improve range of motion by 25% to allow for improved functional mobility to allow for improvement in IADLs. On going     Long Term Goals: 8 weeks  1. Report decreased in pain at worse less than  <   / =  0  /10  to increase tolerance for functional mobility.  On going  2. Increased Hip MMT 1 grade to increase tolerance for ADL and work activities.On going  3. Pt will report at 5% or less limitation on FOTO Lumbar spine survey  to demonstrate decrease in disability and improvement in back pain.On going  4. Pt to be Independent with HEP to improve ROM and independence with ADL's. On going  5. Pt to increase B Ely's Test by greater than > or = 10 degrees in order to improve flexibility and posture. On going  6. Pt to demonstrate negative Bridge Test in order to show improved core strength for lumbar stabilization. On going  7. Pt to improve range of motion by 75% to allow for improved functional mobility to allow for improvement in IADLs. On going  8. Pt will be able to walk 1 hour without any difficulty in order to show improved tolerance for activities. Ongoing    Plan     Cont POC    Shiva Martel Jr, PT

## 2025-01-10 ENCOUNTER — DOCUMENTATION ONLY (OUTPATIENT)
Dept: REHABILITATION | Facility: HOSPITAL | Age: 25
End: 2025-01-10
Payer: COMMERCIAL

## 2025-01-10 NOTE — PROGRESS NOTES
[facility]                                 No Shows Therapy Appointment     Shadenayla Neal Jr.  MRN: [id number]    Patient no shows to today's therapy appointment on 1/10/2025.    Catherine Angulo, KOFI  1/10/2025

## 2025-01-17 ENCOUNTER — DOCUMENTATION ONLY (OUTPATIENT)
Dept: REHABILITATION | Facility: HOSPITAL | Age: 25
End: 2025-01-17
Payer: COMMERCIAL

## 2025-01-17 NOTE — PROGRESS NOTES
[facility]                                 No Shows Therapy Appointment     Shadenayla Neal Jr.  MRN: [id number]    Patient no shows to today's therapy appointment on 1/17/2025.    Catherine Angulo, KOFI  1/17/2025

## 2025-01-24 ENCOUNTER — DOCUMENTATION ONLY (OUTPATIENT)
Dept: REHABILITATION | Facility: HOSPITAL | Age: 25
End: 2025-01-24
Payer: COMMERCIAL

## 2025-01-24 NOTE — PROGRESS NOTES
[facility]                                 No Shows Therapy Appointment     Shadenayla Neal Jr.  MRN: [id number]    Patient no shows to today's therapy appointment on 1/24/2025.    Catherine Angulo, KOFI  1/24/2025

## 2025-06-04 ENCOUNTER — HOSPITAL ENCOUNTER (EMERGENCY)
Facility: HOSPITAL | Age: 25
Discharge: HOME | End: 2025-06-04
Attending: EMERGENCY MEDICINE
Payer: COMMERCIAL

## 2025-06-04 VITALS
TEMPERATURE: 99 F | RESPIRATION RATE: 18 BRPM | WEIGHT: 135 LBS | OXYGEN SATURATION: 98 % | DIASTOLIC BLOOD PRESSURE: 67 MMHG | SYSTOLIC BLOOD PRESSURE: 109 MMHG | BODY MASS INDEX: 18.83 KG/M2 | HEART RATE: 90 BPM

## 2025-06-04 DIAGNOSIS — N39.0 URINARY TRACT INFECTION WITH HEMATURIA, SITE UNSPECIFIED: ICD-10-CM

## 2025-06-04 DIAGNOSIS — R10.9 FLANK PAIN WITH HISTORY OF UROLITHIASIS: Primary | ICD-10-CM

## 2025-06-04 DIAGNOSIS — Z87.442 FLANK PAIN WITH HISTORY OF UROLITHIASIS: Primary | ICD-10-CM

## 2025-06-04 DIAGNOSIS — R31.9 URINARY TRACT INFECTION WITH HEMATURIA, SITE UNSPECIFIED: ICD-10-CM

## 2025-06-04 LAB
ABSOLUTE EOSINOPHIL (OHS): 0.14 K/UL
ABSOLUTE MONOCYTE (OHS): 0.5 K/UL (ref 0.3–1)
ABSOLUTE NEUTROPHIL COUNT (OHS): 2.33 K/UL (ref 1.8–7.7)
ALBUMIN SERPL BCP-MCNC: 4.5 G/DL (ref 3.5–5.2)
ALP SERPL-CCNC: 72 UNIT/L (ref 40–150)
ALT SERPL W/O P-5'-P-CCNC: 12 UNIT/L (ref 10–44)
ANION GAP (OHS): 8 MMOL/L (ref 8–16)
AST SERPL-CCNC: 20 UNIT/L (ref 11–45)
BACTERIA #/AREA URNS AUTO: ABNORMAL /HPF
BASOPHILS # BLD AUTO: 0.01 K/UL
BASOPHILS NFR BLD AUTO: 0.2 %
BILIRUB SERPL-MCNC: 1.3 MG/DL (ref 0.1–1)
BILIRUB UR QL STRIP.AUTO: NEGATIVE
BUN SERPL-MCNC: 10 MG/DL (ref 6–20)
C TRACH DNA SPEC QL NAA+PROBE: DETECTED
CALCIUM SERPL-MCNC: 9.3 MG/DL (ref 8.7–10.5)
CAOX CRY UR QL COMP ASSIST: ABNORMAL
CHLORIDE SERPL-SCNC: 106 MMOL/L (ref 95–110)
CLARITY UR: CLEAR
CO2 SERPL-SCNC: 25 MMOL/L (ref 23–29)
COLOR UR AUTO: YELLOW
CREAT SERPL-MCNC: 1 MG/DL (ref 0.5–1.4)
CTGC SOURCE (OHS) ORD-325: ABNORMAL
ERYTHROCYTE [DISTWIDTH] IN BLOOD BY AUTOMATED COUNT: 13.4 % (ref 11.5–14.5)
GFR SERPLBLD CREATININE-BSD FMLA CKD-EPI: >60 ML/MIN/1.73/M2
GLUCOSE SERPL-MCNC: 91 MG/DL (ref 70–110)
GLUCOSE UR QL STRIP: NEGATIVE
HCT VFR BLD AUTO: 42.4 % (ref 40–54)
HGB BLD-MCNC: 13.8 GM/DL (ref 14–18)
HGB UR QL STRIP: ABNORMAL
HOLD SPECIMEN: NORMAL
IMM GRANULOCYTES # BLD AUTO: 0.01 K/UL (ref 0–0.04)
IMM GRANULOCYTES NFR BLD AUTO: 0.2 % (ref 0–0.5)
KETONES UR QL STRIP: NEGATIVE
LEUKOCYTE ESTERASE UR QL STRIP: ABNORMAL
LYMPHOCYTES # BLD AUTO: 1.94 K/UL (ref 1–4.8)
MCH RBC QN AUTO: 29.6 PG (ref 27–31)
MCHC RBC AUTO-ENTMCNC: 32.5 G/DL (ref 32–36)
MCV RBC AUTO: 91 FL (ref 82–98)
MICROSCOPIC COMMENT: ABNORMAL
N GONORRHOEA DNA UR QL NAA+PROBE: NOT DETECTED
NITRITE UR QL STRIP: NEGATIVE
NUCLEATED RBC (/100WBC) (OHS): 0 /100 WBC
PH UR STRIP: 6 [PH]
PLATELET # BLD AUTO: 240 K/UL (ref 150–450)
PMV BLD AUTO: 9.4 FL (ref 9.2–12.9)
POTASSIUM SERPL-SCNC: 4 MMOL/L (ref 3.5–5.1)
PROT SERPL-MCNC: 7.5 GM/DL (ref 6–8.4)
PROT UR QL STRIP: ABNORMAL
RBC # BLD AUTO: 4.67 M/UL (ref 4.6–6.2)
RBC #/AREA URNS AUTO: 23 /HPF (ref 0–4)
RELATIVE EOSINOPHIL (OHS): 2.8 %
RELATIVE LYMPHOCYTE (OHS): 39.4 % (ref 18–48)
RELATIVE MONOCYTE (OHS): 10.1 % (ref 4–15)
RELATIVE NEUTROPHIL (OHS): 47.3 % (ref 38–73)
SODIUM SERPL-SCNC: 139 MMOL/L (ref 136–145)
SP GR UR STRIP: 1.02
UROBILINOGEN UR STRIP-ACNC: NEGATIVE EU/DL
WBC # BLD AUTO: 4.93 K/UL (ref 3.9–12.7)
WBC #/AREA URNS AUTO: 41 /HPF (ref 0–5)

## 2025-06-04 PROCEDURE — 81003 URINALYSIS AUTO W/O SCOPE: CPT

## 2025-06-04 PROCEDURE — 25000003 PHARM REV CODE 250

## 2025-06-04 PROCEDURE — 96372 THER/PROPH/DIAG INJ SC/IM: CPT

## 2025-06-04 PROCEDURE — 84450 TRANSFERASE (AST) (SGOT): CPT

## 2025-06-04 PROCEDURE — 99285 EMERGENCY DEPT VISIT HI MDM: CPT | Mod: 25

## 2025-06-04 PROCEDURE — 85025 COMPLETE CBC W/AUTO DIFF WBC: CPT

## 2025-06-04 PROCEDURE — 87491 CHLMYD TRACH DNA AMP PROBE: CPT

## 2025-06-04 PROCEDURE — 63600175 PHARM REV CODE 636 W HCPCS: Mod: JZ,TB

## 2025-06-04 PROCEDURE — 96374 THER/PROPH/DIAG INJ IV PUSH: CPT

## 2025-06-04 PROCEDURE — 87086 URINE CULTURE/COLONY COUNT: CPT

## 2025-06-04 RX ORDER — KETOROLAC TROMETHAMINE 30 MG/ML
30 INJECTION, SOLUTION INTRAMUSCULAR; INTRAVENOUS
Status: COMPLETED | OUTPATIENT
Start: 2025-06-04 | End: 2025-06-04

## 2025-06-04 RX ORDER — PHENAZOPYRIDINE HYDROCHLORIDE 200 MG/1
200 TABLET, FILM COATED ORAL
Qty: 9 TABLET | Refills: 0 | Status: SHIPPED | OUTPATIENT
Start: 2025-06-04 | End: 2025-06-07

## 2025-06-04 RX ORDER — CEFTRIAXONE 1 G/1
1 INJECTION, POWDER, FOR SOLUTION INTRAMUSCULAR; INTRAVENOUS
Status: COMPLETED | OUTPATIENT
Start: 2025-06-04 | End: 2025-06-04

## 2025-06-04 RX ORDER — CEPHALEXIN 500 MG/1
1000 CAPSULE ORAL 2 TIMES DAILY
Qty: 40 CAPSULE | Refills: 0 | Status: SHIPPED | OUTPATIENT
Start: 2025-06-04 | End: 2025-06-14

## 2025-06-04 RX ORDER — PHENAZOPYRIDINE HYDROCHLORIDE 100 MG/1
200 TABLET, FILM COATED ORAL
Status: COMPLETED | OUTPATIENT
Start: 2025-06-04 | End: 2025-06-04

## 2025-06-04 RX ADMIN — KETOROLAC TROMETHAMINE 30 MG: 30 INJECTION, SOLUTION INTRAMUSCULAR; INTRAVENOUS at 06:06

## 2025-06-04 RX ADMIN — CEFTRIAXONE 1 G: 1 INJECTION, POWDER, FOR SOLUTION INTRAMUSCULAR; INTRAVENOUS at 09:06

## 2025-06-04 RX ADMIN — PHENAZOPYRIDINE HYDROCHLORIDE 200 MG: 100 TABLET ORAL at 09:06

## 2025-06-04 NOTE — Clinical Note
"Shade "Amparo Neal was seen and treated in our emergency department on 6/4/2025.  He may return to work on 06/06/2025.       If you have any questions or concerns, please don't hesitate to call.      Giancarlo García, CARROLL"

## 2025-06-04 NOTE — ED TRIAGE NOTES
Pt arrived to the ED due to right lower flank pain that started Monday and worsened this morning. Denies any recent injuries or falls. Denies dysuria or n/v. Hx of kidney stones.

## 2025-06-04 NOTE — DISCHARGE INSTRUCTIONS

## 2025-06-04 NOTE — ED PROVIDER NOTES
Encounter Date: 6/4/2025       History     Chief Complaint   Patient presents with    Back Pain     On and off to rt side since Monday, denies injury     Shade Neal Jr. is a 25-year-old male with past medical history of urolithiasis who presents to the emergency department for evaluation of right-sided low back pain.  Symptoms present for the last 2 days.  Denies any specific inciting incident or trauma to the area.  Denies any heavy lifting.  Denies urinary symptoms including frequency, urgency, dysuria, and hematuria.  States feels different from prior episodes of urolithiasis.  He works for [employer] as a .  BC powder and ibuprofen at home with temporary relief of symptoms.  Denies numbness, weakness, bowel or bladder incontinence, IV drug use, immunocompromise state, saddle anesthesia.    The history is provided by the patient. No  was used.     Review of patient's allergies indicates:  No Known Allergies  Past Medical History:   Diagnosis Date    Hives     large whelps several times in the past    Kidney stone     Laryngeal edema allergic agent unknown    history of allergic reactions     Past Surgical History:   Procedure Laterality Date    TYMPANOPLASTY      right ear     Family History   Problem Relation Name Age of Onset    No Known Problems Mother      No Known Problems Father      No Known Problems Sister      No Known Problems Brother      No Known Problems Daughter      No Known Problems Son      No Known Problems Maternal Aunt      No Known Problems Maternal Uncle      No Known Problems Paternal Aunt      No Known Problems Paternal Uncle      Cancer Maternal Grandmother          stomach cancer    Stroke Maternal Grandfather      Hypertension Maternal Grandfather      Hypertension Paternal Grandfather       Social History[1]  Review of Systems   Constitutional:  Negative for appetite change, chills, fatigue and fever.   HENT:  Negative for sore throat.     Respiratory:  Negative for shortness of breath.    Cardiovascular:  Negative for chest pain.   Gastrointestinal:  Negative for nausea and vomiting.   Genitourinary:  Positive for flank pain (Right). Negative for dysuria, frequency, hematuria and urgency.   Musculoskeletal:  Positive for back pain (Right). Negative for gait problem, neck pain and neck stiffness.   Skin:  Negative for rash.   Neurological:  Negative for weakness, numbness and headaches.   Hematological:  Does not bruise/bleed easily.       Physical Exam     Initial Vitals [06/04/25 0634]   BP Pulse Resp Temp SpO2   (!) 112/58 68 14 97.8 °F (36.6 °C) 100 %      MAP       --         Physical Exam    Nursing note and vitals reviewed.  Constitutional: Vital signs are normal. He appears well-developed and well-nourished. He is cooperative. He does not appear ill. No distress.   Well-appearing.  No acute distress.  Alert and interactive.   HENT:   Head: Normocephalic and atraumatic.   Right Ear: External ear normal.   Left Ear: External ear normal.   Nose: Nose normal.   Eyes: Conjunctivae and EOM are normal.   Neck: Phonation normal.   Normal range of motion.  Cardiovascular:  Normal rate and regular rhythm.           No murmur heard.  Regular rate and rhythm.   Pulmonary/Chest: Effort normal. No respiratory distress.   Respirations even and unlabored.   Abdominal: Abdomen is flat. He exhibits no distension. There is no abdominal tenderness.   Soft, nontender, nondistended.   No right CVA tenderness.  No left CVA tenderness.   Musculoskeletal:      Cervical back: Normal range of motion.      Comments: Spine palpated from occiput to sacrum.  No midline bony tenderness.  No bony step-offs.  Minimal to mild tenderness to palpation in the musculature of the low back on the right.  No CVA tenderness.     Neurological: He is alert and oriented to person, place, and time. GCS eye subscore is 4. GCS verbal subscore is 5. GCS motor subscore is 6.   Motor function  and sensation intact and symmetrical in bilateral lower extremities.  Ambulatory with a steady gait.   Skin: Skin is warm and dry. Capillary refill takes less than 2 seconds. No rash noted.         ED Course   Procedures  Labs Reviewed   URINALYSIS, REFLEX TO URINE CULTURE - Abnormal       Result Value    Color, UA Yellow      Appearance, UA Clear      pH, UA 6.0      Spec Grav UA 1.020      Protein, UA Trace (*)     Glucose, UA Negative      Ketones, UA Negative      Bilirubin, UA Negative      Blood, UA 1+ (*)     Nitrites, UA Negative      Urobilinogen, UA Negative      Leukocyte Esterase, UA 2+ (*)    URINALYSIS MICROSCOPIC - Abnormal    RBC, UA 23 (*)     WBC, UA 41 (*)     Bacteria, UA Few (*)     Calcium Oxalate Crystals, UA Occasional      Microscopic Comment       COMPREHENSIVE METABOLIC PANEL - Abnormal    Sodium 139      Potassium 4.0      Chloride 106      CO2 25      Glucose 91      BUN 10      Creatinine 1.0      Calcium 9.3      Protein Total 7.5      Albumin 4.5      Bilirubin Total 1.3 (*)     ALP 72      AST 20      ALT 12      Anion Gap 8      eGFR >60     CBC WITH DIFFERENTIAL - Abnormal    WBC 4.93      RBC 4.67      HGB 13.8 (*)     HCT 42.4      MCV 91      MCH 29.6      MCHC 32.5      RDW 13.4      Platelet Count 240      MPV 9.4      Nucleated RBC 0      Neut % 47.3      Lymph % 39.4      Mono % 10.1      Eos % 2.8      Basophil % 0.2      Imm Grans % 0.2      Neut # 2.33      Lymph # 1.94      Mono # 0.50      Eos # 0.14      Baso # 0.01      Imm Grans # 0.01     CULTURE, URINE   GREY TOP URINE HOLD    Extra Tube Hold for add-ons.     CBC W/ AUTO DIFFERENTIAL    Narrative:     The following orders were created for panel order CBC auto differential.  Procedure                               Abnormality         Status                     ---------                               -----------         ------                     CBC with Differential[[id number]]       Abnormal            Final  result                 Please view results for these tests on the individual orders.   C. TRACHOMATIS/N. GONORRHOEAE BY AMP DNA          Imaging Results              CT Renal Stone Study ABD Pelvis WO (Final result)  Result time 06/04/25 09:25:28      Final result by Vijay Almonte MD (06/04/25 09:25:28)                   Impression:      Bilateral nonobstructing renal stones measuring up to 2 mm.  No hydronephrosis.    No acute process in the abdomen or pelvis.      Electronically signed by: Vijay Almonte MD  Date:    06/04/2025  Time:    09:25               Narrative:    EXAMINATION:  CT RENAL STONE STUDY ABD PELVIS WO    CLINICAL HISTORY:  Flank pain, kidney stone suspected;    TECHNIQUE:  Low dose axial images, sagittal and coronal reformations were obtained from the lung bases to the pubic symphysis.  Contrast was not administered.    COMPARISON:  Retroperitoneal ultrasound from 10/10/2024 and CT abdomen pelvis from 03/09/2024.    FINDINGS:  Heart: No pericardial effusion.    Lung Bases: Well aerated, without consolidation or pleural fluid.    Liver: Normal in size and attenuation, with no focal hepatic lesions.    Gallbladder: No calcified gallstones.    Bile Ducts: No evidence of dilated ducts.    Pancreas: No mass or peripancreatic fat stranding.    Spleen: Unremarkable.    Adrenals: Unremarkable.    Kidneys/ Ureters: Right kidney demonstrates 5 nonobstructing stones measuring up to 2 mm.  Left kidney demonstrates 4 nonobstructing stones measuring up to 2 mm.  No discrete hydronephrosis.  Ureters are difficult to follow throughout their entire course, however no calcifications are visualized along the expected course of the ureters.  Pelvic phleboliths present.    Bladder: No evidence of wall thickening.    Reproductive organs: Unremarkable.    GI Tract/Mesentery: No evidence of bowel obstruction or inflammation.  Normal appendix.  Moderate volume of stool throughout the colon which may be seen with  constipation.    Lymph nodes: No lymphadenopathy.    Vasculature: No aneurysm.    Abdominal wall: Unremarkable.    Bones: No acute fracture.                                       Medications   ketorolac injection 30 mg (30 mg Intramuscular Given 6/4/25 0983)   cefTRIAXone injection 1 g (1 g Intravenous Given 6/4/25 3395)   phenazopyridine tablet 200 mg (200 mg Oral Given 6/4/25 2744)     Medical Decision Making  25-year-old male with a history of urolithiasis presenting to the emergency department for evaluation of right-sided low back pain for the last 2 days.  No inciting incident, trauma, heavy lifting, etc..  No urinary symptoms.  No back pain red flags.  On exam, well-appearing and in no acute distress.  Vitals within normal limits.  Minimal to mild tenderness to palpation in the musculature of the low back on the right.  No CVA tenderness.  Neurovascularly intact in bilateral lower extremities.    Differential diagnosis includes but is not limited to lumbago with or without sciatica, low back strain, contusion, fracture, compression fracture, dislocation, cauda equina syndrome, spinal epidural abscess, spinal epidural hematoma.     No red flags for cauda equina such as incontinence, urinary retention, saddle anesthesia, weakness, numbness, or neurological deficit.  No red flags for infection including fever, IV drug use, or immunocompromised state.  No red flags concerning for cancer such as weight loss, night sweats, pain worse at rest, or spinal tenderness.  Also considered but doubt vertebral fracture, aortic dissection, aortic aneurysm, herniated disc.     Urinalysis with trace protein, 1+ blood, 2+ leukocytes.  Microscopic UA with 23 RBCs, 41 WBCs, few bacteria, occasional calcium oxalate crystals per high-powered field.  Gonorrhea/chlamydia testing pending.  History of urolithiasis.  CT was ordered to help differentiate between urolithiasis versus urinary tract infection.  CT with stones in the kidneys,  no stones in the ureters, lower suspicion that urolithiasis is causing patient's symptoms today.  As such I will treat for urinary tract infection given UA, flank pain.  IV Rocephin in the emergency department.  Toradol and phenazopyridine for pain control with good relief of symptoms.  Discharged with Keflex.  No evidence of systemic infectious process including urosepsis.  Stable for discharge to outpatient follow up.    Return precautions were discussed, all patient questions were answered, and the patient was agreeable to the plan of care.  He was discharged home in stable condition and will follow up with his primary care provider or return to the emergency department if his symptoms worsen or do not improve.     Amount and/or Complexity of Data Reviewed  Labs: ordered.  Radiology: ordered.    Risk  Prescription drug management.                                      Clinical Impression:  Final diagnoses:  [N39.0, R31.9] Urinary tract infection with hematuria, site unspecified  [R10.9, Z87.442] Flank pain with history of urolithiasis (Primary)          ED Disposition Condition    Discharge Stable          ED Prescriptions       Medication Sig Dispense Start Date End Date Auth. Provider    cephALEXin (KEFLEX) 500 MG capsule Take 2 capsules (1,000 mg total) by mouth 2 (two) times a day. for 10 days 40 capsule 6/4/2025 6/14/2025 Giancarlo García, PA-C    phenazopyridine (PYRIDIUM) 200 MG tablet Take 1 tablet (200 mg total) by mouth 3 (three) times daily with meals. for 3 days 9 tablet 6/4/2025 6/7/2025 Giancarlo García, PATED          Follow-up Information       Follow up With Specialties Details Why Contact Info    Deanna Mo MD Family Medicine Schedule an appointment as soon as possible for a visit  As needed, If symptoms worsen [address] [facility]  [address]  [place] [address]  [phone]      [facility] - Emergency Dept Emergency Medicine Go to  If symptoms worsen [address] [facility]  [facility] [id number] [phone]                   [1]   Social History  Tobacco Use    Smoking status: Never     Passive exposure: Never    Smokeless tobacco: Never   Substance Use Topics    Alcohol use: No    Drug use: No        Giancarlo García, PAAlbertC  06/04/25 1046

## 2025-06-06 LAB — BACTERIA UR CULT: NORMAL

## 2025-06-12 ENCOUNTER — HOSPITAL ENCOUNTER (EMERGENCY)
Facility: HOSPITAL | Age: 25
Discharge: HOME | End: 2025-06-12
Attending: STUDENT IN AN ORGANIZED HEALTH CARE EDUCATION/TRAINING PROGRAM
Payer: COMMERCIAL

## 2025-06-12 VITALS
RESPIRATION RATE: 16 BRPM | TEMPERATURE: 98 F | OXYGEN SATURATION: 99 % | HEART RATE: 75 BPM | DIASTOLIC BLOOD PRESSURE: 72 MMHG | SYSTOLIC BLOOD PRESSURE: 112 MMHG

## 2025-06-12 DIAGNOSIS — L29.9 ITCHING: ICD-10-CM

## 2025-06-12 DIAGNOSIS — T78.40XA ALLERGIC REACTION, INITIAL ENCOUNTER: Primary | ICD-10-CM

## 2025-06-12 DIAGNOSIS — R22.0 FACIAL SWELLING: ICD-10-CM

## 2025-06-12 PROCEDURE — 99284 EMERGENCY DEPT VISIT MOD MDM: CPT | Mod: 25

## 2025-06-12 PROCEDURE — 63600175 PHARM REV CODE 636 W HCPCS: Mod: JZ,TB | Performed by: NURSE PRACTITIONER

## 2025-06-12 PROCEDURE — 96375 TX/PRO/DX INJ NEW DRUG ADDON: CPT

## 2025-06-12 PROCEDURE — 96374 THER/PROPH/DIAG INJ IV PUSH: CPT

## 2025-06-12 RX ORDER — METHYLPREDNISOLONE SOD SUCC 125 MG
125 VIAL (EA) INJECTION
Status: COMPLETED | OUTPATIENT
Start: 2025-06-12 | End: 2025-06-12

## 2025-06-12 RX ORDER — FAMOTIDINE 10 MG/ML
20 INJECTION, SOLUTION INTRAVENOUS
Status: COMPLETED | OUTPATIENT
Start: 2025-06-12 | End: 2025-06-12

## 2025-06-12 RX ORDER — EPINEPHRINE 0.3 MG/.3ML
1 INJECTION SUBCUTANEOUS ONCE
Qty: 1 EACH | Refills: 1 | Status: SHIPPED | OUTPATIENT
Start: 2025-06-12 | End: 2025-06-12

## 2025-06-12 RX ADMIN — FAMOTIDINE 20 MG: 10 INJECTION, SOLUTION INTRAVENOUS at 11:06

## 2025-06-12 RX ADMIN — METHYLPREDNISOLONE SODIUM SUCCINATE 125 MG: 125 INJECTION, POWDER, FOR SOLUTION INTRAMUSCULAR; INTRAVENOUS at 11:06

## 2025-06-12 NOTE — ED PROVIDER NOTES
Encounter Date: 6/12/2025       History     Chief Complaint   Patient presents with    Rash     Was at work and dust from a dumpster got on him, and now he is itchy all over, 50 mg iv benadryl given, no distress, no oral swelling     HPI  This is a 25-year-old male with a past medical history significant for allergic reaction, laryngeal edema and kidney stones who presents to the emergency department today for evaluation of facial swelling and itching.  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  He reports that he immediately started swelling.  Reports significant swelling to his lips.  He also reports that his eyes were completely swollen shut.  He was unable to call EMS due to the significance of the facial swelling.  EMS gave him 50 mg of Benadryl and he reports some improvement.  He states he is now able to open his eyes and the lip swelling has improved but is still present.  He denies any difficulty breathing or shortness of breath.  He initially had generalized itching but this has resolved.  He is unsure what was in the container.  He was working this job alone so no one else was exposed or experienced symptoms.  He is still wearing his work uniform with the dust from the exposure.  He denies any other problems or concerns at this time.    Review of patient's allergies indicates:  No Known Allergies  Past Medical History:   Diagnosis Date    Hives     large whelps several times in the past    Kidney stone     Laryngeal edema allergic agent unknown    history of allergic reactions     Past Surgical History:   Procedure Laterality Date    TYMPANOPLASTY      right ear     Family History   Problem Relation Name Age of Onset    No Known Problems Mother      No Known Problems Father      No Known Problems Sister      No Known Problems Brother      No Known Problems Daughter      No Known Problems Son      No Known Problems Maternal Aunt      No Known Problems  Maternal Uncle      No Known Problems Paternal Aunt      No Known Problems Paternal Uncle      Cancer Maternal Grandmother          stomach cancer    Stroke Maternal Grandfather      Hypertension Maternal Grandfather      Hypertension Paternal Grandfather       Social History[1]  Review of Systems  Per HPI    Physical Exam     Initial Vitals [06/12/25 0958]   BP Pulse Resp Temp SpO2   115/78 66 16 98 °F (36.7 °C) 100 %      MAP       --         Physical Exam  Nursing note and vitals reviewed.  Constitutional: Patient appears well-developed and well-nourished. Not diaphoretic. No distress.   HENT:  Facial swelling is present.  There is mild infraorbital swelling bilaterally.  Upper and lower lips are both noted to be swollen, upper is worse than the lower.  There is no swelling to the posterior oropharynx.  Normal voice.  Eyes: Conjunctivae are normal. No scleral icterus.   Neck: Normal range of motion.   Pulmonary/Chest: No respiratory distress. Speaking in complete sentences. No accessory muscle use.  Musculoskeletal: Normal range of motion.   Neurological: Alert and oriented to person, place, and time.  Skin: Skin is warm and dry.   Psychiatric: Normal mood and affect. Thought content normal.  He is drowsy secondary to the Benadryl he received by EMS.      ED Course   Procedures  Labs Reviewed - No data to display       Imaging Results    None          Medications   methylPREDNISolone sodium succinate injection 125 mg (125 mg Intravenous Given 6/12/25 1111)   famotidine (PF) injection 20 mg (20 mg Intravenous Given 6/12/25 1110)     Medical Decision Making  25-year-old male presenting after exposure to an unknown allergen while at work.  He works for a waste management company and got an unknown substance from the dumpster on him before his symptoms began.  EMS was called for significant facial swelling and itching.  He reports that when EMS came his eyes were swollen shut and he had massive swelling to the lips.   On my assessment, he still has mild infraorbital swelling but is able to open both eyes.  He still has swelling to the lips but no intraoral involvement.  Speaking in full sentences and does not appear distressed at this time.      I discussed this case with Dr. Gallegos and the charge nurse.      Will give Solu-Medrol and Pepcid IV and continue to monitor.    Will have patient change into paper scrubs to remove ?contaminated clothing. Advised that he shower here in the ED but pt refused.    On re-assessment, all swelling has resolved. Pt appears well. Will continue to monitor a little long but plan for dc home.    Pt continues to have completely resolved symptoms. Remains a little drowsy from Benadryl but expresses desire for discharge home. Will dc home with rx for epipen and advise home Benadryl PRN. He needs to follow up with an allergist outpatient. Will order ambulatory referral. Strict ED return precautions discussed with patient and visitor.    Risk  Prescription drug management.                                      Clinical Impression:  Final diagnoses:  [T78.40XA] Allergic reaction, initial encounter (Primary)  [R22.0] Facial swelling  [L29.9] Itching          ED Disposition Condition    Discharge Stable          ED Prescriptions       Medication Sig Dispense Start Date End Date Auth. Provider    EPINEPHrine (EPIPEN) 0.3 mg/0.3 mL AtIn (Expires today) Inject 0.3 mLs (0.3 mg total) into the muscle once. for 1 dose 1 each 6/12/2025 6/12/2025 Ebony Kate NP          Follow-up Information       Follow up With Specialties Details Why Contact Info Additional Information    Deanna Mo MD Family Medicine Schedule an appointment as soon as possible for a visit in 1 week  [address] [place] [address]  [address] [place] [address]  [phone]       [facility] Gregg - Allergy/Immunology Allergy Schedule an appointment as soon as possible for a visit in 1 week a referral has been made [id number]  [place]  [place] [id number] [phone] [address] [place] - [address]    [facility] Gregg - Emergency Dept Emergency Medicine Go to  As needed, If symptoms worsen [address]  [facility] [address] [phone]                    [1]   Social History  Tobacco Use    Smoking status: Never     Passive exposure: Never    Smokeless tobacco: Never   Substance Use Topics    Alcohol use: No    Drug use: No        Ebony Kate, NP  06/12/25 2398

## 2025-06-12 NOTE — ED TRIAGE NOTES
Shade Neal ., a 25 y.o. male presents to the ED w/ complaint of states had hives, lips wer swollen, eyes were swollen while at work but seems to have al resolved    Triage note:  Chief Complaint   Patient presents with    Rash     Was at work and dust from a dumpster got on him, and now he is itchy all over, 50 mg iv benadryl given, no distress, no oral swelling     Review of patient's allergies indicates:  No Known Allergies        APPEARANCE: awake and alert in NAD. PAIN  0/10  SKIN: warm, dry and intact. No breakdown or bruising.  MUSCULOSKELETAL: Patient moving all extremities spontaneously, no obvious swelling or deformities noted. Ambulates independently.  RESPIRATORY: Denies shortness of breath.Respirations unlabored.   CARDIAC: Denies CP, 2+ distal pulses; no peripheral edema  ABDOMEN: S/ND/NT, Denies nausea  : voids spontaneously, denies difficulty  Neurologic: AAO x 4; follows commands equal strength in all extremities; denies numbness/tingling. Denies dizziness

## 2025-06-25 ENCOUNTER — TELEPHONE (OUTPATIENT)
Facility: CLINIC | Age: 25
End: 2025-06-25
Payer: COMMERCIAL